# Patient Record
Sex: FEMALE | Race: WHITE | NOT HISPANIC OR LATINO | Employment: OTHER | URBAN - METROPOLITAN AREA
[De-identification: names, ages, dates, MRNs, and addresses within clinical notes are randomized per-mention and may not be internally consistent; named-entity substitution may affect disease eponyms.]

---

## 2023-09-20 PROBLEM — E55.9 VITAMIN D DEFICIENCY: Status: ACTIVE | Noted: 2023-09-20

## 2023-09-20 PROBLEM — J44.9 CHRONIC OBSTRUCTIVE PULMONARY DISEASE (MULTI): Status: ACTIVE | Noted: 2023-09-20

## 2023-09-20 PROBLEM — R53.82 CHRONIC FATIGUE: Status: ACTIVE | Noted: 2023-09-20

## 2023-09-20 PROBLEM — M81.0 POST-MENOPAUSAL OSTEOPOROSIS: Status: ACTIVE | Noted: 2023-09-20

## 2023-09-20 PROBLEM — E63.8 NUTRITIONAL INTAKE LESS THAN BODY REQUIREMENTS: Status: ACTIVE | Noted: 2023-09-20

## 2023-09-20 PROBLEM — F10.10 ETOH ABUSE: Status: ACTIVE | Noted: 2023-09-20

## 2023-09-20 PROBLEM — I10 ESSENTIAL HYPERTENSION: Status: ACTIVE | Noted: 2023-09-20

## 2023-09-20 PROBLEM — Z91.89 AT RISK FOR BLEEDING: Status: ACTIVE | Noted: 2023-09-20

## 2023-09-20 PROBLEM — I63.9 CVA (CEREBRAL VASCULAR ACCIDENT) (MULTI): Status: ACTIVE | Noted: 2023-09-20

## 2023-09-20 PROBLEM — G89.29 OTHER CHRONIC PAIN: Status: ACTIVE | Noted: 2023-09-20

## 2023-09-20 PROBLEM — F03.918 DEMENTIA WITH BEHAVIORAL DISTURBANCE (MULTI): Status: ACTIVE | Noted: 2023-09-20

## 2023-09-20 PROBLEM — I82.90 VENOUS THROMBOSIS: Status: ACTIVE | Noted: 2023-09-20

## 2023-09-20 PROBLEM — E78.2 MIXED HYPERLIPIDEMIA: Status: ACTIVE | Noted: 2023-09-20

## 2023-09-20 RX ORDER — CHOLECALCIFEROL (VITAMIN D3) 1250 MCG
50000 TABLET ORAL
COMMUNITY
Start: 2018-11-02 | End: 2023-10-26 | Stop reason: ALTCHOICE

## 2023-09-20 RX ORDER — ATORVASTATIN CALCIUM 20 MG/1
20 TABLET, FILM COATED ORAL DAILY
COMMUNITY
End: 2024-04-25 | Stop reason: SDUPTHER

## 2023-09-20 RX ORDER — ASPIRIN 81 MG/1
81 TABLET ORAL DAILY
COMMUNITY

## 2023-09-20 RX ORDER — LISINOPRIL 40 MG/1
40 TABLET ORAL DAILY
COMMUNITY
End: 2023-10-26 | Stop reason: SDUPTHER

## 2023-09-20 RX ORDER — AMLODIPINE BESYLATE 5 MG/1
5 TABLET ORAL NIGHTLY
COMMUNITY
End: 2023-10-26 | Stop reason: ALTCHOICE

## 2023-09-20 RX ORDER — HYDROCHLOROTHIAZIDE 25 MG/1
25 TABLET ORAL DAILY
COMMUNITY
End: 2023-10-26 | Stop reason: ALTCHOICE

## 2023-09-20 RX ORDER — DONEPEZIL HYDROCHLORIDE 10 MG/1
10 TABLET, FILM COATED ORAL NIGHTLY
COMMUNITY
End: 2023-10-26 | Stop reason: ALTCHOICE

## 2023-09-20 RX ORDER — POTASSIUM CHLORIDE 750 MG/1
10 CAPSULE, EXTENDED RELEASE ORAL DAILY
COMMUNITY
End: 2023-10-26 | Stop reason: ALTCHOICE

## 2023-10-26 ENCOUNTER — LAB (OUTPATIENT)
Dept: LAB | Facility: LAB | Age: 78
End: 2023-10-26
Payer: COMMERCIAL

## 2023-10-26 ENCOUNTER — OFFICE VISIT (OUTPATIENT)
Dept: PRIMARY CARE | Facility: CLINIC | Age: 78
End: 2023-10-26
Payer: COMMERCIAL

## 2023-10-26 VITALS
HEART RATE: 85 BPM | TEMPERATURE: 98.2 F | OXYGEN SATURATION: 97 % | SYSTOLIC BLOOD PRESSURE: 128 MMHG | WEIGHT: 140 LBS | BODY MASS INDEX: 26.45 KG/M2 | DIASTOLIC BLOOD PRESSURE: 84 MMHG

## 2023-10-26 DIAGNOSIS — E78.2 MIXED HYPERLIPIDEMIA: ICD-10-CM

## 2023-10-26 DIAGNOSIS — I10 ESSENTIAL HYPERTENSION: ICD-10-CM

## 2023-10-26 DIAGNOSIS — Z00.00 MEDICARE ANNUAL WELLNESS VISIT, SUBSEQUENT: Primary | ICD-10-CM

## 2023-10-26 DIAGNOSIS — E55.9 VITAMIN D DEFICIENCY: ICD-10-CM

## 2023-10-26 DIAGNOSIS — I63.9 CEREBROVASCULAR ACCIDENT (CVA), UNSPECIFIED MECHANISM (MULTI): ICD-10-CM

## 2023-10-26 DIAGNOSIS — M81.0 POST-MENOPAUSAL OSTEOPOROSIS: ICD-10-CM

## 2023-10-26 DIAGNOSIS — F03.90 DEMENTIA WITHOUT BEHAVIORAL DISTURBANCE (MULTI): ICD-10-CM

## 2023-10-26 LAB
25(OH)D3 SERPL-MCNC: 71 NG/ML (ref 31–100)
ALBUMIN SERPL-MCNC: 4.3 G/DL (ref 3.5–5)
ALP BLD-CCNC: 69 U/L (ref 35–125)
ALT SERPL-CCNC: 15 U/L (ref 5–40)
ANION GAP SERPL CALC-SCNC: 13 MMOL/L
AST SERPL-CCNC: 22 U/L (ref 5–40)
BASOPHILS # BLD AUTO: 0.05 X10*3/UL (ref 0–0.1)
BASOPHILS NFR BLD AUTO: 0.6 %
BILIRUB SERPL-MCNC: 0.7 MG/DL (ref 0.1–1.2)
BUN SERPL-MCNC: 8 MG/DL (ref 8–25)
CALCIUM SERPL-MCNC: 9.7 MG/DL (ref 8.5–10.4)
CHLORIDE SERPL-SCNC: 100 MMOL/L (ref 97–107)
CHOLEST SERPL-MCNC: 151 MG/DL (ref 133–200)
CHOLEST/HDLC SERPL: 2.1 {RATIO}
CO2 SERPL-SCNC: 21 MMOL/L (ref 24–31)
CREAT SERPL-MCNC: 0.9 MG/DL (ref 0.4–1.6)
EOSINOPHIL # BLD AUTO: 0.11 X10*3/UL (ref 0–0.4)
EOSINOPHIL NFR BLD AUTO: 1.3 %
ERYTHROCYTE [DISTWIDTH] IN BLOOD BY AUTOMATED COUNT: 13.7 % (ref 11.5–14.5)
GFR SERPL CREATININE-BSD FRML MDRD: 65 ML/MIN/1.73M*2
GLUCOSE SERPL-MCNC: 99 MG/DL (ref 65–99)
HCT VFR BLD AUTO: 44.8 % (ref 36–46)
HDLC SERPL-MCNC: 71 MG/DL
HGB BLD-MCNC: 15.1 G/DL (ref 12–16)
IMM GRANULOCYTES # BLD AUTO: 0.02 X10*3/UL (ref 0–0.5)
IMM GRANULOCYTES NFR BLD AUTO: 0.2 % (ref 0–0.9)
LDLC SERPL CALC-MCNC: 61 MG/DL (ref 65–130)
LYMPHOCYTES # BLD AUTO: 3.12 X10*3/UL (ref 0.8–3)
LYMPHOCYTES NFR BLD AUTO: 37 %
MCH RBC QN AUTO: 31.1 PG (ref 26–34)
MCHC RBC AUTO-ENTMCNC: 33.7 G/DL (ref 32–36)
MCV RBC AUTO: 92 FL (ref 80–100)
MONOCYTES # BLD AUTO: 0.68 X10*3/UL (ref 0.05–0.8)
MONOCYTES NFR BLD AUTO: 8.1 %
NEUTROPHILS # BLD AUTO: 4.45 X10*3/UL (ref 1.6–5.5)
NEUTROPHILS NFR BLD AUTO: 52.8 %
NRBC BLD-RTO: 0 /100 WBCS (ref 0–0)
PLATELET # BLD AUTO: 277 X10*3/UL (ref 150–450)
PMV BLD AUTO: 10.1 FL (ref 7.5–11.5)
POTASSIUM SERPL-SCNC: 4.9 MMOL/L (ref 3.4–5.1)
PROT SERPL-MCNC: 6.9 G/DL (ref 5.9–7.9)
RBC # BLD AUTO: 4.85 X10*6/UL (ref 4–5.2)
SODIUM SERPL-SCNC: 134 MMOL/L (ref 133–145)
TRIGL SERPL-MCNC: 97 MG/DL (ref 40–150)
WBC # BLD AUTO: 8.4 X10*3/UL (ref 4.4–11.3)

## 2023-10-26 PROCEDURE — 80053 COMPREHEN METABOLIC PANEL: CPT

## 2023-10-26 PROCEDURE — 85025 COMPLETE CBC W/AUTO DIFF WBC: CPT

## 2023-10-26 PROCEDURE — 36415 COLL VENOUS BLD VENIPUNCTURE: CPT

## 2023-10-26 PROCEDURE — 1159F MED LIST DOCD IN RCRD: CPT | Performed by: INTERNAL MEDICINE

## 2023-10-26 PROCEDURE — G0439 PPPS, SUBSEQ VISIT: HCPCS | Performed by: INTERNAL MEDICINE

## 2023-10-26 PROCEDURE — 1126F AMNT PAIN NOTED NONE PRSNT: CPT | Performed by: INTERNAL MEDICINE

## 2023-10-26 PROCEDURE — 3079F DIAST BP 80-89 MM HG: CPT | Performed by: INTERNAL MEDICINE

## 2023-10-26 PROCEDURE — 80061 LIPID PANEL: CPT

## 2023-10-26 PROCEDURE — 1036F TOBACCO NON-USER: CPT | Performed by: INTERNAL MEDICINE

## 2023-10-26 PROCEDURE — 3074F SYST BP LT 130 MM HG: CPT | Performed by: INTERNAL MEDICINE

## 2023-10-26 PROCEDURE — 82306 VITAMIN D 25 HYDROXY: CPT

## 2023-10-26 RX ORDER — ERGOCALCIFEROL (VITAMIN D2) 50 MCG
2000 CAPSULE ORAL DAILY
Qty: 90 CAPSULE | Refills: 2 | Status: SHIPPED | OUTPATIENT
Start: 2023-10-26 | End: 2024-07-22

## 2023-10-26 RX ORDER — LISINOPRIL 40 MG/1
40 TABLET ORAL DAILY
Qty: 90 TABLET | Refills: 1 | Status: SHIPPED | OUTPATIENT
Start: 2023-10-26 | End: 2024-04-25

## 2023-10-26 ASSESSMENT — ENCOUNTER SYMPTOMS
POLYPHAGIA: 0
COUGH: 0
FREQUENCY: 0
SEIZURES: 0
DEPRESSION: 0
TROUBLE SWALLOWING: 0
UNEXPECTED WEIGHT CHANGE: 0
POLYDIPSIA: 0
BLOOD IN STOOL: 0
CHILLS: 0
ABDOMINAL PAIN: 0
DYSURIA: 0
MYALGIAS: 0
SINUS PRESSURE: 0
NAUSEA: 0
SHORTNESS OF BREATH: 0
FATIGUE: 0
TREMORS: 0
NUMBNESS: 0
BACK PAIN: 0
PALPITATIONS: 0
VOMITING: 0
OCCASIONAL FEELINGS OF UNSTEADINESS: 0
WHEEZING: 0
LOSS OF SENSATION IN FEET: 0

## 2023-10-26 ASSESSMENT — PATIENT HEALTH QUESTIONNAIRE - PHQ9
2. FEELING DOWN, DEPRESSED OR HOPELESS: NOT AT ALL
SUM OF ALL RESPONSES TO PHQ9 QUESTIONS 1 AND 2: 0
1. LITTLE INTEREST OR PLEASURE IN DOING THINGS: NOT AT ALL

## 2023-10-26 ASSESSMENT — PAIN SCALES - GENERAL: PAINLEVEL: 0-NO PAIN

## 2023-10-26 NOTE — PROGRESS NOTES
Subjective   Patient ID: Allyssa Gregg is a 78 y.o. female who presents for No chief complaint on file..    HPI            She is accompanied by her sister.          She lives alone.         H/O dementia- stated has not started taking Aricept yet due to medication side effects. Advised patient to try for 1-2 weeks and give us a call back. She forgets day, week and month, she has a calendar, which helps her remember.         H/O HTN- compliant with medications.         H/O CVA- compliant with statin's. Not sure if she is taking Aspirin daily. Advised compliance with aspirin.          Denied any recent falls. Has food form meals on the wheels.         Drinks 2-3 beers a day - advised to reduce alcohol intake.    Review of Systems   Constitutional:  Negative for chills, fatigue and unexpected weight change.   HENT:  Negative for postnasal drip, sinus pressure and trouble swallowing.    Respiratory:  Negative for cough, shortness of breath and wheezing.    Cardiovascular:  Negative for chest pain, palpitations and leg swelling.   Gastrointestinal:  Negative for abdominal pain, blood in stool, nausea and vomiting.   Endocrine: Negative for polydipsia, polyphagia and polyuria.   Genitourinary:  Negative for dysuria and frequency.   Musculoskeletal:  Negative for back pain and myalgias.   Skin:  Negative for rash.   Neurological:  Negative for tremors, seizures and numbness.        Memory loss+   Psychiatric/Behavioral:  Negative for behavioral problems.        Objective   /84 (BP Location: Right arm, Patient Position: Sitting, BP Cuff Size: Adult)   Pulse 85   Temp 36.8 °C (98.2 °F) (Temporal)   Wt 63.5 kg (140 lb)   SpO2 97%   BMI 26.45 kg/m²     Physical Exam  Constitutional:       General: She is not in acute distress.     Appearance: Normal appearance.   HENT:      Head: Normocephalic and atraumatic.      Right Ear: Tympanic membrane normal.      Left Ear: Tympanic membrane normal.   Eyes:      Extraocular  Movements: Extraocular movements intact.      Pupils: Pupils are equal, round, and reactive to light.   Cardiovascular:      Rate and Rhythm: Normal rate and regular rhythm.      Heart sounds: Normal heart sounds. No murmur heard.     No friction rub.   Pulmonary:      Effort: Pulmonary effort is normal.      Breath sounds: Normal breath sounds. No wheezing or rales.   Abdominal:      General: Bowel sounds are normal.      Palpations: Abdomen is soft.      Tenderness: There is no abdominal tenderness. There is no guarding.   Musculoskeletal:         General: Normal range of motion.      Cervical back: Normal range of motion and neck supple.      Right lower leg: No edema.      Left lower leg: No edema.   Skin:     General: Skin is warm and dry.      Findings: No rash.   Neurological:      General: No focal deficit present.      Mental Status: She is alert.      Cranial Nerves: No cranial nerve deficit.      Sensory: No sensory deficit.      Comments: Oriented to herself   Psychiatric:         Mood and Affect: Mood normal.         Assessment/Plan       Diagnoses and all orders for this visit:  Medicare annual wellness visit, subsequent (Primary)  Essential hypertension  -     lisinopril 40 mg tablet; Take 1 tablet (40 mg) by mouth once daily.  -     CBC and Auto Differential; Future  -     Comprehensive Metabolic Panel; Future  Dementia without behavioral disturbance (CMS/HCC)  Vitamin D deficiency  -     Vitamin D 25-Hydroxy,Total (for eval of Vitamin D levels); Future  -     ergocalciferol (Vitamin D-2) 50 MCG (2000 UT) capsule capsule; Take 1 capsule (50 mcg) by mouth once daily.  Post-menopausal osteoporosis  Mixed hyperlipidemia  -     Lipid Panel; Future  Cerebrovascular accident (CVA), unspecified mechanism (CMS/HCC)     Patient and family reluctant to start any medications for dementia.  Offered Aricept before.  She is accompanied by her sister today, discussed to cpnsider healthcare power of  or will  due to her age and memory issues.       Current Outpatient Medications   Medication Instructions    aspirin 81 mg, oral, Daily    atorvastatin (LIPITOR) 20 mg, oral, Daily    ergocalciferol (VITAMIN D-2) 50 mcg, oral, Daily    lisinopril 40 mg, oral, Daily

## 2024-04-25 ENCOUNTER — OFFICE VISIT (OUTPATIENT)
Dept: PRIMARY CARE | Facility: CLINIC | Age: 79
End: 2024-04-25
Payer: COMMERCIAL

## 2024-04-25 ENCOUNTER — APPOINTMENT (OUTPATIENT)
Dept: PRIMARY CARE | Facility: CLINIC | Age: 79
End: 2024-04-25
Payer: COMMERCIAL

## 2024-04-25 VITALS
TEMPERATURE: 97.5 F | BODY MASS INDEX: 28.27 KG/M2 | SYSTOLIC BLOOD PRESSURE: 130 MMHG | OXYGEN SATURATION: 97 % | HEART RATE: 79 BPM | WEIGHT: 144 LBS | HEIGHT: 60 IN | DIASTOLIC BLOOD PRESSURE: 74 MMHG

## 2024-04-25 DIAGNOSIS — I10 ESSENTIAL HYPERTENSION: Primary | ICD-10-CM

## 2024-04-25 DIAGNOSIS — E78.2 MIXED HYPERLIPIDEMIA: ICD-10-CM

## 2024-04-25 DIAGNOSIS — F03.90 DEMENTIA WITHOUT BEHAVIORAL DISTURBANCE (MULTI): ICD-10-CM

## 2024-04-25 DIAGNOSIS — J44.9 CHRONIC OBSTRUCTIVE PULMONARY DISEASE, UNSPECIFIED COPD TYPE (MULTI): ICD-10-CM

## 2024-04-25 DIAGNOSIS — Z86.73 H/O: CVA (CEREBROVASCULAR ACCIDENT): ICD-10-CM

## 2024-04-25 PROCEDURE — 1124F ACP DISCUSS-NO DSCNMKR DOCD: CPT | Performed by: INTERNAL MEDICINE

## 2024-04-25 PROCEDURE — 99214 OFFICE O/P EST MOD 30 MIN: CPT | Performed by: INTERNAL MEDICINE

## 2024-04-25 PROCEDURE — 1036F TOBACCO NON-USER: CPT | Performed by: INTERNAL MEDICINE

## 2024-04-25 PROCEDURE — 3078F DIAST BP <80 MM HG: CPT | Performed by: INTERNAL MEDICINE

## 2024-04-25 PROCEDURE — 3075F SYST BP GE 130 - 139MM HG: CPT | Performed by: INTERNAL MEDICINE

## 2024-04-25 PROCEDURE — 1157F ADVNC CARE PLAN IN RCRD: CPT | Performed by: INTERNAL MEDICINE

## 2024-04-25 PROCEDURE — 1126F AMNT PAIN NOTED NONE PRSNT: CPT | Performed by: INTERNAL MEDICINE

## 2024-04-25 PROCEDURE — 1159F MED LIST DOCD IN RCRD: CPT | Performed by: INTERNAL MEDICINE

## 2024-04-25 RX ORDER — DONEPEZIL HYDROCHLORIDE 10 MG/1
10 TABLET, FILM COATED ORAL NIGHTLY
Qty: 90 TABLET | Refills: 1 | Status: SHIPPED | OUTPATIENT
Start: 2024-04-25 | End: 2024-10-22

## 2024-04-25 RX ORDER — ATORVASTATIN CALCIUM 20 MG/1
20 TABLET, FILM COATED ORAL DAILY
Qty: 90 TABLET | Refills: 3 | Status: SHIPPED | OUTPATIENT
Start: 2024-04-25

## 2024-04-25 ASSESSMENT — ENCOUNTER SYMPTOMS
PALPITATIONS: 0
BLOOD IN STOOL: 0
WHEEZING: 0
POLYDIPSIA: 0
FREQUENCY: 0
SHORTNESS OF BREATH: 0
DEPRESSION: 0
DYSURIA: 0
LOSS OF SENSATION IN FEET: 0
ABDOMINAL PAIN: 0
MYALGIAS: 0
CHILLS: 0
UNEXPECTED WEIGHT CHANGE: 0
TREMORS: 0
SEIZURES: 0
COUGH: 0
FATIGUE: 0
VOMITING: 0
NUMBNESS: 0
POLYPHAGIA: 0
TROUBLE SWALLOWING: 0
SINUS PRESSURE: 0
OCCASIONAL FEELINGS OF UNSTEADINESS: 0
BACK PAIN: 0
NAUSEA: 0

## 2024-04-25 ASSESSMENT — COLUMBIA-SUICIDE SEVERITY RATING SCALE - C-SSRS
2. HAVE YOU ACTUALLY HAD ANY THOUGHTS OF KILLING YOURSELF?: NO
6. HAVE YOU EVER DONE ANYTHING, STARTED TO DO ANYTHING, OR PREPARED TO DO ANYTHING TO END YOUR LIFE?: NO
1. IN THE PAST MONTH, HAVE YOU WISHED YOU WERE DEAD OR WISHED YOU COULD GO TO SLEEP AND NOT WAKE UP?: NO

## 2024-04-25 ASSESSMENT — PAIN SCALES - GENERAL: PAINLEVEL: 0-NO PAIN

## 2024-04-25 NOTE — PROGRESS NOTES
Subjective   Patient ID: Allyssa Gregg is a 78 y.o. female who presents for 6 month f/u.    HPI            She is accompanied by her sister.          She lives alone.         H/O dementia- stated has not started taking Aricept yet due to medication side effects. Advised patient to try for 1-2 weeks and give us a call back. She forgets day, week and month, she has a calendar, which helps her remember.  Discussed side effects with sister, she is going to discuss with Allyssa's kids         H/O HTN- compliant with medications.         H/O CVA- compliant with statin's. Not sure if she is taking Aspirin daily. Advised compliance with aspirin.          Denied any recent falls. Has food form meals on the wheels.         Drinks 2-3 beers a day - advised to reduce alcohol intake.    Review of Systems   Constitutional:  Negative for chills, fatigue and unexpected weight change.   HENT:  Negative for postnasal drip, sinus pressure and trouble swallowing.    Respiratory:  Negative for cough, shortness of breath and wheezing.    Cardiovascular:  Negative for chest pain, palpitations and leg swelling.   Gastrointestinal:  Negative for abdominal pain, blood in stool, nausea and vomiting.   Endocrine: Negative for polydipsia, polyphagia and polyuria.   Genitourinary:  Negative for dysuria and frequency.   Musculoskeletal:  Negative for back pain and myalgias.   Skin:  Negative for rash.   Neurological:  Negative for tremors, seizures and numbness.        Memory loss+   Psychiatric/Behavioral:  Negative for behavioral problems.        Objective   /74 (BP Location: Left arm, Patient Position: Sitting, BP Cuff Size: Large adult)   Pulse 79   Temp 36.4 °C (97.5 °F) (Temporal)   Ht 1.524 m (5')   Wt 65.3 kg (144 lb)   SpO2 97%   BMI 28.12 kg/m²     Physical Exam  Constitutional:       General: She is not in acute distress.     Appearance: Normal appearance.   HENT:      Head: Normocephalic and atraumatic.   Eyes:      Extraocular  Movements: Extraocular movements intact.   Cardiovascular:      Rate and Rhythm: Normal rate and regular rhythm.      Heart sounds: Normal heart sounds. No murmur heard.     No friction rub.   Pulmonary:      Effort: Pulmonary effort is normal.      Breath sounds: Normal breath sounds. No wheezing or rales.   Abdominal:      General: Bowel sounds are normal.      Palpations: Abdomen is soft.      Tenderness: There is no abdominal tenderness. There is no guarding.   Musculoskeletal:      Right lower leg: No edema.      Left lower leg: No edema.   Neurological:      General: No focal deficit present.      Mental Status: She is alert.      Cranial Nerves: No cranial nerve deficit.      Comments: Oriented to herself   Psychiatric:         Mood and Affect: Mood normal.         Assessment/Plan       Allyssa was seen today for 6 month f/u.  Diagnoses and all orders for this visit:  Essential hypertension (Primary)  Mixed hyperlipidemia  -     atorvastatin (Lipitor) 20 mg tablet; Take 1 tablet (20 mg) by mouth once daily.  Dementia without behavioral disturbance (Multi)  -     Referral to Podiatry; Future  -     donepezil (Aricept) 10 mg tablet; Take 1 tablet (10 mg) by mouth once daily at bedtime.  Chronic obstructive pulmonary disease, unspecified COPD type (Multi)  H/O: CVA (cerebrovascular accident)    Reviewed labs with patient  Started on Aricept, discussed medication side effects  Offered referral to case management as she may need some help with medications, as per sister she has a  to discuss Allyssa's needs  She is accompanied by her sister today, discussed to consider healthcare power of  or will due to her age and memory issues.       Current Outpatient Medications   Medication Instructions    aspirin 81 mg, oral, Daily    atorvastatin (LIPITOR) 20 mg, oral, Daily    donepezil (ARICEPT) 10 mg, oral, Nightly    ergocalciferol (VITAMIN D-2) 50 mcg, oral, Daily    lisinopril 40 mg, oral, Daily

## 2024-10-02 ENCOUNTER — APPOINTMENT (OUTPATIENT)
Dept: RADIOLOGY | Facility: HOSPITAL | Age: 79
End: 2024-10-02
Payer: COMMERCIAL

## 2024-10-02 ENCOUNTER — APPOINTMENT (OUTPATIENT)
Dept: CARDIOLOGY | Facility: HOSPITAL | Age: 79
End: 2024-10-02
Payer: COMMERCIAL

## 2024-10-02 ENCOUNTER — HOSPITAL ENCOUNTER (INPATIENT)
Facility: HOSPITAL | Age: 79
End: 2024-10-02
Attending: STUDENT IN AN ORGANIZED HEALTH CARE EDUCATION/TRAINING PROGRAM | Admitting: INTERNAL MEDICINE
Payer: COMMERCIAL

## 2024-10-02 DIAGNOSIS — R53.82 CHRONIC FATIGUE: ICD-10-CM

## 2024-10-02 DIAGNOSIS — I10 ESSENTIAL HYPERTENSION: ICD-10-CM

## 2024-10-02 DIAGNOSIS — R11.2 NAUSEA AND VOMITING, UNSPECIFIED VOMITING TYPE: ICD-10-CM
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
## 2024-10-02 DIAGNOSIS — R53.1 WEAKNESS: ICD-10-CM

## 2024-10-02 DIAGNOSIS — F10.10 ETOH ABUSE: ICD-10-CM

## 2024-10-02 LAB
ANION GAP SERPL CALCULATED.3IONS-SCNC: 16 MMOL/L (ref 10–20)
APPEARANCE UR: CLEAR
BASOPHILS # BLD AUTO: 0.03 X10*3/UL (ref 0–0.1)
BASOPHILS NFR BLD AUTO: 0.2 %
BILIRUB UR STRIP.AUTO-MCNC: NEGATIVE MG/DL
BUN SERPL-MCNC: 10 MG/DL (ref 6–23)
CALCIUM SERPL-MCNC: 9.1 MG/DL (ref 8.6–10.3)
CHLORIDE SERPL-SCNC: 90 MMOL/L (ref 98–107)
CK SERPL-CCNC: 240 U/L (ref 0–215)
CO2 SERPL-SCNC: 26 MMOL/L (ref 21–32)
COLOR UR: COLORLESS
CREAT SERPL-MCNC: 0.8 MG/DL (ref 0.5–1.05)
EGFRCR SERPLBLD CKD-EPI 2021: 75 ML/MIN/1.73M*2
EOSINOPHIL # BLD AUTO: 0.02 X10*3/UL (ref 0–0.4)
EOSINOPHIL NFR BLD AUTO: 0.1 %
ERYTHROCYTE [DISTWIDTH] IN BLOOD BY AUTOMATED COUNT: 13.3 % (ref 11.5–14.5)
GLUCOSE SERPL-MCNC: 143 MG/DL (ref 74–99)
GLUCOSE UR STRIP.AUTO-MCNC: ABNORMAL MG/DL
HCT VFR BLD AUTO: 52.6 % (ref 36–46)
HGB BLD-MCNC: 18.1 G/DL (ref 12–16)
IMM GRANULOCYTES # BLD AUTO: 0.05 X10*3/UL (ref 0–0.5)
IMM GRANULOCYTES NFR BLD AUTO: 0.4 % (ref 0–0.9)
KETONES UR STRIP.AUTO-MCNC: ABNORMAL MG/DL
LEUKOCYTE ESTERASE UR QL STRIP.AUTO: NEGATIVE
LIPASE SERPL-CCNC: 20 U/L (ref 9–82)
LYMPHOCYTES # BLD AUTO: 0.93 X10*3/UL (ref 0.8–3)
LYMPHOCYTES NFR BLD AUTO: 6.6 %
MCH RBC QN AUTO: 32.3 PG (ref 26–34)
MCHC RBC AUTO-ENTMCNC: 34.4 G/DL (ref 32–36)
MCV RBC AUTO: 94 FL (ref 80–100)
MONOCYTES # BLD AUTO: 0.44 X10*3/UL (ref 0.05–0.8)
MONOCYTES NFR BLD AUTO: 3.1 %
NEUTROPHILS # BLD AUTO: 12.53 X10*3/UL (ref 1.6–5.5)
NEUTROPHILS NFR BLD AUTO: 89.6 %
NITRITE UR QL STRIP.AUTO: NEGATIVE
NRBC BLD-RTO: 0 /100 WBCS (ref 0–0)
PH UR STRIP.AUTO: 6.5 [PH]
PLATELET # BLD AUTO: 292 X10*3/UL (ref 150–450)
POTASSIUM SERPL-SCNC: 3.1 MMOL/L (ref 3.5–5.3)
PROT UR STRIP.AUTO-MCNC: NEGATIVE MG/DL
RBC # BLD AUTO: 5.6 X10*6/UL (ref 4–5.2)
RBC # UR STRIP.AUTO: NEGATIVE /UL
SODIUM SERPL-SCNC: 129 MMOL/L (ref 136–145)
SP GR UR STRIP.AUTO: 1.01
UROBILINOGEN UR STRIP.AUTO-MCNC: NORMAL MG/DL
WBC # BLD AUTO: 14 X10*3/UL (ref 4.4–11.3)

## 2024-10-02 PROCEDURE — 36415 COLL VENOUS BLD VENIPUNCTURE: CPT

## 2024-10-02 PROCEDURE — 85025 COMPLETE CBC W/AUTO DIFF WBC: CPT

## 2024-10-02 PROCEDURE — 81003 URINALYSIS AUTO W/O SCOPE: CPT | Performed by: STUDENT IN AN ORGANIZED HEALTH CARE EDUCATION/TRAINING PROGRAM

## 2024-10-02 PROCEDURE — 96360 HYDRATION IV INFUSION INIT: CPT

## 2024-10-02 PROCEDURE — 99285 EMERGENCY DEPT VISIT HI MDM: CPT | Mod: 25

## 2024-10-02 PROCEDURE — 1100000001 HC PRIVATE ROOM DAILY

## 2024-10-02 PROCEDURE — 80048 BASIC METABOLIC PNL TOTAL CA: CPT

## 2024-10-02 PROCEDURE — 74177 CT ABD & PELVIS W/CONTRAST: CPT

## 2024-10-02 PROCEDURE — 70450 CT HEAD/BRAIN W/O DYE: CPT | Performed by: RADIOLOGY

## 2024-10-02 PROCEDURE — 2500000004 HC RX 250 GENERAL PHARMACY W/ HCPCS (ALT 636 FOR OP/ED): Performed by: INTERNAL MEDICINE

## 2024-10-02 PROCEDURE — 82550 ASSAY OF CK (CPK): CPT

## 2024-10-02 PROCEDURE — 2550000001 HC RX 255 CONTRASTS: Performed by: STUDENT IN AN ORGANIZED HEALTH CARE EDUCATION/TRAINING PROGRAM

## 2024-10-02 PROCEDURE — 70450 CT HEAD/BRAIN W/O DYE: CPT

## 2024-10-02 PROCEDURE — 93005 ELECTROCARDIOGRAM TRACING: CPT

## 2024-10-02 PROCEDURE — 2500000001 HC RX 250 WO HCPCS SELF ADMINISTERED DRUGS (ALT 637 FOR MEDICARE OP): Performed by: INTERNAL MEDICINE

## 2024-10-02 PROCEDURE — 2500000004 HC RX 250 GENERAL PHARMACY W/ HCPCS (ALT 636 FOR OP/ED): Performed by: STUDENT IN AN ORGANIZED HEALTH CARE EDUCATION/TRAINING PROGRAM

## 2024-10-02 PROCEDURE — 93010 ELECTROCARDIOGRAM REPORT: CPT | Performed by: INTERNAL MEDICINE

## 2024-10-02 PROCEDURE — 74177 CT ABD & PELVIS W/CONTRAST: CPT | Performed by: RADIOLOGY

## 2024-10-02 PROCEDURE — 99223 1ST HOSP IP/OBS HIGH 75: CPT | Performed by: INTERNAL MEDICINE

## 2024-10-02 PROCEDURE — 83690 ASSAY OF LIPASE: CPT | Performed by: STUDENT IN AN ORGANIZED HEALTH CARE EDUCATION/TRAINING PROGRAM

## 2024-10-02 PROCEDURE — 96361 HYDRATE IV INFUSION ADD-ON: CPT

## 2024-10-02 PROCEDURE — 2500000001 HC RX 250 WO HCPCS SELF ADMINISTERED DRUGS (ALT 637 FOR MEDICARE OP)

## 2024-10-02 RX ORDER — POTASSIUM CHLORIDE 750 MG/1
10 TABLET, FILM COATED, EXTENDED RELEASE ORAL DAILY
Status: DISCONTINUED | OUTPATIENT
Start: 2024-10-02 | End: 2024-10-02

## 2024-10-02 RX ORDER — ACETAMINOPHEN 325 MG/1
650 TABLET ORAL EVERY 6 HOURS PRN
Status: DISPENSED | OUTPATIENT
Start: 2024-10-02

## 2024-10-02 RX ORDER — ATORVASTATIN CALCIUM 20 MG/1
20 TABLET, FILM COATED ORAL NIGHTLY
Status: DISPENSED | OUTPATIENT
Start: 2024-10-03

## 2024-10-02 RX ORDER — LISINOPRIL 40 MG/1
40 TABLET ORAL DAILY
Status: DISPENSED | OUTPATIENT
Start: 2024-10-03

## 2024-10-02 RX ORDER — ASPIRIN 81 MG/1
81 TABLET ORAL DAILY
Status: DISPENSED | OUTPATIENT
Start: 2024-10-03

## 2024-10-02 RX ORDER — ACETAMINOPHEN 500 MG
5 TABLET ORAL NIGHTLY PRN
Status: DISPENSED | OUTPATIENT
Start: 2024-10-02

## 2024-10-02 RX ORDER — ALUMINUM HYDROXIDE, MAGNESIUM HYDROXIDE, AND SIMETHICONE 1200; 120; 1200 MG/30ML; MG/30ML; MG/30ML
30 SUSPENSION ORAL 4 TIMES DAILY PRN
Status: ACTIVE | OUTPATIENT
Start: 2024-10-02

## 2024-10-02 RX ORDER — POTASSIUM CHLORIDE 1.5 G/1.58G
20 POWDER, FOR SOLUTION ORAL ONCE
Status: COMPLETED | OUTPATIENT
Start: 2024-10-02 | End: 2024-10-02

## 2024-10-02 RX ORDER — ONDANSETRON HYDROCHLORIDE 2 MG/ML
4 INJECTION, SOLUTION INTRAVENOUS EVERY 6 HOURS PRN
Status: ACTIVE | OUTPATIENT
Start: 2024-10-02

## 2024-10-02 RX ORDER — SODIUM CHLORIDE AND POTASSIUM CHLORIDE 150; 900 MG/100ML; MG/100ML
100 INJECTION, SOLUTION INTRAVENOUS CONTINUOUS
Status: DISPENSED | OUTPATIENT
Start: 2024-10-02

## 2024-10-02 RX ORDER — DONEPEZIL HYDROCHLORIDE 10 MG/1
10 TABLET, FILM COATED ORAL NIGHTLY
Status: DISPENSED | OUTPATIENT
Start: 2024-10-02

## 2024-10-02 RX ORDER — ENOXAPARIN SODIUM 100 MG/ML
40 INJECTION SUBCUTANEOUS DAILY
Status: DISPENSED | OUTPATIENT
Start: 2024-10-03

## 2024-10-02 RX ORDER — ONDANSETRON 4 MG/1
4 TABLET, FILM COATED ORAL 4 TIMES DAILY PRN
Status: ACTIVE | OUTPATIENT
Start: 2024-10-02

## 2024-10-02 RX ADMIN — IOHEXOL 75 ML: 350 INJECTION, SOLUTION INTRAVENOUS at 20:57

## 2024-10-02 RX ADMIN — SODIUM CHLORIDE AND POTASSIUM CHLORIDE 100 ML/HR: 9; 1.49 INJECTION, SOLUTION INTRAVENOUS at 22:56

## 2024-10-02 RX ADMIN — Medication 5 MG: at 22:45

## 2024-10-02 RX ADMIN — SODIUM CHLORIDE 1000 ML: 9 INJECTION, SOLUTION INTRAVENOUS at 17:31

## 2024-10-02 RX ADMIN — POTASSIUM CHLORIDE 20 MEQ: 1.5 FOR SOLUTION ORAL at 21:26

## 2024-10-02 RX ADMIN — DONEPEZIL HYDROCHLORIDE 10 MG: 10 TABLET, FILM COATED ORAL at 22:45

## 2024-10-02 RX ADMIN — ACETAMINOPHEN 650 MG: 325 TABLET ORAL at 22:45

## 2024-10-02 ASSESSMENT — COLUMBIA-SUICIDE SEVERITY RATING SCALE - C-SSRS
1. IN THE PAST MONTH, HAVE YOU WISHED YOU WERE DEAD OR WISHED YOU COULD GO TO SLEEP AND NOT WAKE UP?: NO
2. HAVE YOU ACTUALLY HAD ANY THOUGHTS OF KILLING YOURSELF?: NO
6. HAVE YOU EVER DONE ANYTHING, STARTED TO DO ANYTHING, OR PREPARED TO DO ANYTHING TO END YOUR LIFE?: NO

## 2024-10-02 ASSESSMENT — COGNITIVE AND FUNCTIONAL STATUS - GENERAL
MOBILITY SCORE: 16
HELP NEEDED FOR BATHING: A LITTLE
EATING MEALS: A LITTLE
DAILY ACTIVITIY SCORE: 18
MOVING TO AND FROM BED TO CHAIR: A LITTLE
MOVING FROM LYING ON BACK TO SITTING ON SIDE OF FLAT BED WITH BEDRAILS: A LITTLE
TURNING FROM BACK TO SIDE WHILE IN FLAT BAD: A LITTLE
CLIMB 3 TO 5 STEPS WITH RAILING: A LOT
PERSONAL GROOMING: A LITTLE
DRESSING REGULAR UPPER BODY CLOTHING: A LITTLE
DRESSING REGULAR LOWER BODY CLOTHING: A LITTLE
STANDING UP FROM CHAIR USING ARMS: A LITTLE
TOILETING: A LITTLE
WALKING IN HOSPITAL ROOM: A LOT

## 2024-10-02 ASSESSMENT — PAIN SCALES - GENERAL
PAINLEVEL_OUTOF10: 0 - NO PAIN
PAINLEVEL_OUTOF10: 0 - NO PAIN

## 2024-10-02 ASSESSMENT — PAIN - FUNCTIONAL ASSESSMENT
PAIN_FUNCTIONAL_ASSESSMENT: 0-10
PAIN_FUNCTIONAL_ASSESSMENT: 0-10

## 2024-10-02 NOTE — ED PROVIDER NOTES
HPI   Chief Complaint   Patient presents with    Fall       Patient is an 79-year-old female presenting as a fall from.  Report is that patient was put to sleep in her bed and [employer] found her on the floor with multiple bouts of emesis on the ground.  Patient has history of dementia and is unable to recall the fall.  She states that she does not have any pain and denies having fallen or feelings of nausea.  Patient denies fevers, chills, cough, sore throat, runny nose, chest pain, shortness of breath, abdominal pain, nausea, vomiting, diarrhea or urinary complaints.  ROS is limited due to patient's history of dementia.              Patient History   No past medical history on file.  Past Surgical History:   Procedure Laterality Date    MR HEAD ANGIO WO IV CONTRAST  10/22/2016    MR HEAD ANGIO WO IV CONTRAST [facility]     No family history on file.  Social History     Tobacco Use    Smoking status: Former     Types: Cigarettes    Smokeless tobacco: Never   Vaping Use    Vaping status: Never Used   Substance Use Topics    Alcohol use: Yes     Alcohol/week: 4.0 standard drinks of alcohol     Types: 4 Cans of beer per week    Drug use: Never       Physical Exam   ED Triage Vitals [10/02/24 1617]   Temperature Heart Rate Respirations BP   36.4 °C (97.5 °F) 93 18 (!) 167/109      Pulse Ox Temp src Heart Rate Source Patient Position   95 % -- -- --      BP Location FiO2 (%)     -- --       Physical Exam  Vitals and nursing note reviewed.   Constitutional:       Appearance: She is well-developed.      Comments: Awake, pleasantly confused, laying in examination bed   HENT:      Head: Normocephalic and atraumatic.      Nose: Nose normal.      Mouth/Throat:      Mouth: Mucous membranes are moist.      Pharynx: Oropharynx is clear.   Eyes:      Extraocular Movements: Extraocular movements intact.      Conjunctiva/sclera: Conjunctivae normal.      Pupils: Pupils are equal, round, and reactive to light.    Cardiovascular:      Rate and Rhythm: Normal rate and regular rhythm.      Pulses: Normal pulses.      Heart sounds: Normal heart sounds. No murmur heard.  Pulmonary:      Effort: Pulmonary effort is normal. No respiratory distress.      Breath sounds: Normal breath sounds.   Abdominal:      Palpations: Abdomen is soft.      Tenderness: There is no abdominal tenderness.   Musculoskeletal:         General: No swelling. Normal range of motion.      Cervical back: Normal range of motion and neck supple.   Skin:     General: Skin is warm and dry.      Capillary Refill: Capillary refill takes less than 2 seconds.   Neurological:      General: No focal deficit present.      Mental Status: She is alert. Mental status is at baseline.   Psychiatric:         Mood and Affect: Mood normal.         Behavior: Behavior normal.           ED Course & MDM   ED Course as of 10/02/24 2321   Wed Oct 02, 2024   1645 EKG on my interpretation shows sinus rhythm with rate of 95 beats minute.  Normal axis.  QTc is 517 ms, AK interval 144.  There is patient movement artifact present in all leads.  No ST elevation or depression, no acute ischemic pattern.  No STEMI. [NT]   2134 I spoke with admitting provider Dr. Olivares.  After our discussion, patient will be admitted for placement. [AR]      ED Course User Index  [AR] Marlon Medeiros PA-C  [NT] Eleazar Mooney DO         Diagnoses as of 10/02/24 2321   Fall, initial encounter   Nausea and vomiting, unspecified vomiting type   Weakness                 No data recorded     Andi Coma Scale Score: 14 (10/02/24 1622 : Genesis Malik RN)       NIH Stroke Scale: 6 (10/02/24 1622 : Genesis Malik RN)                   Medical Decision Making  Patient is an 79-year-old female presenting after a fall from a nursing facility.  Lab work ordered.  Conditions considered include but not limited to: Mechanical fall, dementia, viral illness.    I saw this patient in conjunction with   Jesica.  Upon examination by attending physician, family is at bedside.  They report that [employer] did find this patient on the ground.  Report is that this patient also had a bout of emesis while in the daughter had arrived report is also that the patient was feeling significantly weak and was unable to get himself up off the floor and required assistance getting off the floor which is part of why she is presenting to the ER.  Additional lab work, urine, imaging ordered.    CBC does show leukocytosis with WC of 14.0 as well as hemoconcentration and hemoglobin of 18.1.  Concern for dehydration, IV fluids ordered.  BMP does show mild hyponatremia 129 and hypokalemia potassium 3.1.  Oral potassium ordered.  UA with micro is without infection.  Lipase within normal limit.  CK is elevated at 240.  Head CT without acute findings.  CT abdomen pelvis without acute findings.    I spoke with admitting provider, Dr. Olivares.  After our discussion, patient will be admitted for further management of weakness requiring long-term care placement.  As I deemed necessary from the patient's history, physical, laboratory and imaging findings as well as ED course, I considered the above listed diagnoses.    Portions of this note made with Dragon software, please be mindful of potential grammatical errors.        Medications   ondansetron (Zofran) tablet 4 mg (has no administration in time range)   ondansetron (Zofran) injection 4 mg (has no administration in time range)   acetaminophen (Tylenol) tablet 650 mg (650 mg oral Given 10/2/24 2245)   melatonin tablet 5 mg (5 mg oral Given 10/2/24 2245)   alum-mag hydroxide-simeth (Mylanta) 200-200-20 mg/5 mL oral suspension 30 mL (has no administration in time range)   enoxaparin (Lovenox) syringe 40 mg (has no administration in time range)   aspirin EC tablet 81 mg (has no administration in time range)   atorvastatin (Lipitor) tablet 20 mg (has no administration in time range)    donepezil (Aricept) tablet 10 mg (10 mg oral Given 10/2/24 2245)   lisinopril tablet 40 mg (has no administration in time range)   sodium chloride 0.9 % with KCl 20 mEq/L infusion (100 mL/hr intravenous New Bag 10/2/24 2256)   sodium chloride 0.9 % bolus 1,000 mL (0 mL intravenous Stopped 10/2/24 1907)   iohexol (OMNIPaque) 350 mg iodine/mL solution 75 mL (75 mL intravenous Given 10/2/24 2057)   potassium chloride (Klor-Con) packet 20 mEq (20 mEq oral Given 10/2/24 2126)         Labs Reviewed   CBC WITH AUTO DIFFERENTIAL - Abnormal       Result Value    WBC 14.0 (*)     nRBC 0.0      RBC 5.60 (*)     Hemoglobin 18.1 (*)     Hematocrit 52.6 (*)     MCV 94      MCH 32.3      MCHC 34.4      RDW 13.3      Platelets 292      Neutrophils % 89.6      Immature Granulocytes %, Automated 0.4      Lymphocytes % 6.6      Monocytes % 3.1      Eosinophils % 0.1      Basophils % 0.2      Neutrophils Absolute 12.53 (*)     Immature Granulocytes Absolute, Automated 0.05      Lymphocytes Absolute 0.93      Monocytes Absolute 0.44      Eosinophils Absolute 0.02      Basophils Absolute 0.03     BASIC METABOLIC PANEL - Abnormal    Glucose 143 (*)     Sodium 129 (*)     Potassium 3.1 (*)     Chloride 90 (*)     Bicarbonate 26      Anion Gap 16      Urea Nitrogen 10      Creatinine 0.80      eGFR 75      Calcium 9.1     CREATINE KINASE - Abnormal    Creatine Kinase 240 (*)    URINALYSIS WITH REFLEX CULTURE AND MICROSCOPIC - Abnormal    Color, Urine Colorless (*)     Appearance, Urine Clear      Specific Gravity, Urine 1.008      pH, Urine 6.5      Protein, Urine NEGATIVE      Glucose, Urine 70 (1+) (*)     Blood, Urine NEGATIVE      Ketones, Urine 10 (1+) (*)     Bilirubin, Urine NEGATIVE      Urobilinogen, Urine Normal      Nitrite, Urine NEGATIVE      Leukocyte Esterase, Urine NEGATIVE     LIPASE - Normal    Lipase 20      Narrative:     Venipuncture immediately after or during the administration of Metamizole may lead to falsely low  results. Testing should be performed immediately prior to Metamizole dosing.   URINALYSIS WITH REFLEX CULTURE AND MICROSCOPIC    Narrative:     The following orders were created for panel order Urinalysis with Reflex Culture and Microscopic.  Procedure                               Abnormality         Status                     ---------                               -----------         ------                     Urinalysis with Reflex C...[[id number]]  Abnormal            Final result               Extra Urine Gray Tube[[id number]]                            In process                   Please view results for these tests on the individual orders.   EXTRA URINE GRAY TUBE   BASIC METABOLIC PANEL   TSH         CT abdomen pelvis w IV contrast   Final Result   1.  No evidence of acute intra-abdominal pathology.             MACRO:   None        Signed by: Michael Hoover 10/2/2024 9:16 PM   Dictation workstation:   [id number]      CT head wo IV contrast   Final Result   No evidence of acute cortical infarct or intracranial hemorrhage.        MACRO:   None        Signed by: Michael Hoover 10/2/2024 9:05 PM   Dictation workstation:   [id number]            Procedure  Procedures     Marlon Medeiros PA-C  10/02/24 4503

## 2024-10-02 NOTE — ED PROVIDER NOTES
Given verbal order by  [facility] to order & give patient Influenza vaccine. This is an 79-year-old female with a past medical history of dementia, CVA, COPD, hypertension, hyperlipidemia who presents the emergency department for evaluation after being found on the ground unable to get up next to her bed.  [employer] called the family and said that she did not answer the door when they came to her house at 11 AM which is unusual for her.  They went to check on her and her daughter found her sitting next to the bed, with a couple of places where she vomited in the bedroom.  She was unable to get up due to general weakness.  EMS was called and she was brought to the hospital for evaluation.  It is unclear how long she has been on the ground, she was last seen well last night by her son before getting into bed.  The patient has dementia and is not a provide anything any full history.  She denies any pain, any complaints at this time, she feels otherwise well.  Daughter states that mentally she is at her baseline currently.    On exam she has no obvious signs of trauma.  No tenderness to palpation over the hips or pelvis, upper or lower extremities, the midline cervical thoracic or lumbar spine, there is no bruising or cephalhematoma to the head to suggest significant head trauma.  Given the unknown nature of her fall I did CT her head to evaluate for intracranial hemorrhage, labs were to assess for any cause for general weakness as well as CK to exclude rhabdomyolysis given unknown time on the ground.  Abdominal CT ordered given her vomiting and difficulty obtaining history due to her dementia to assess for any acute intra-abdominal pathology requiring treatment at the hospital such as infection or obstruction.    Her medical workup in the ED was unremarkable.  There is no evidence of UTI, she is dehydrated with low sodium and chloride and mildly hypokalemic likely due to her vomiting.  Potassium replacement was given in the ED.  CK was mildly elevated as well likely due to prolonged  downtime.  There is no evidence of renal insufficiency.  She does not have a UTI or other identifiable source for infection.    She has no focal neurologic deficits on exam concerning for stroke, however she is generally weak in all extremities.  She required 2 person assist to get up and ambulate to and from the bathroom in the emergency department, normally she is able to do this on her own and lives alone.  Family feels that she is not able to care for herself in this condition at home alone.  Because of this she will be admitted to the hospital for further medical management of her dehydration, potential placement for rehab or skilled nursing if she remains unable to care for herself.    I did review the chest x-ray image and do not see any infiltrates concerning for pneumonia, pneumothorax or other acute process in the chest.  Radiology confirms this.    ED Course as of 10/03/24 1338   Wed Oct 02, 2024   1645 EKG on my interpretation shows sinus rhythm with rate of 95 beats minute.  Normal axis.  QTc is 517 ms, MI interval 144.  There is patient movement artifact present in all leads.  No ST elevation or depression, no acute ischemic pattern.  No STEMI. [NT]   2134 I spoke with admitting provider Dr. Olivares.  After our discussion, patient will be admitted for placement. [AR]      ED Course User Index  [AR] Marlon Medeiros PA-C  [NT] Eleazar Mooney DO         Diagnoses as of 10/03/24 1338   Fall, initial encounter   Nausea and vomiting, unspecified vomiting type   Weakness         Physical Exam  General: well developed, well nourished elderly female who is awake and alert, oriented to self, in no apparent distress.  Dementia is evident, daughter at bedside.  Eyes: sclera clear bilaterally, PERRL, EOMI  HENT: normocephalic, atraumatic. Pharynx without erythema or exudates, uvula midline.  No apparent oral or dental injury.  CV: regular rate and rhythm, no murmur, no gallops, or rubs.   Resp: clear  to ascultation bilaterally, no wheezes, rales, or rhonchi  GI: abdomen soft, nontender without rigidity or guarding, no peritoneal signs, abdomen is nondistended, no masses palpated  MSK: No midline spinal tenderness, strength +5/5 to upper and lower extremities bilaterally, no swelling of the extremities.  Neuro: no focal deficits, CN2-12 intact. Sensation fully intact.   Psych: Pleasantly demented, calm and cooperative with exam  Skin: warm, dry, without evidence of rash or abrasions       Eleazar Mooney,   10/03/24 7606

## 2024-10-02 NOTE — ED TRIAGE NOTES
Pt brought via EMS from home for unwitnessed presumed fall, sister found her down on the ground, unknown downtime. Pt confused in triage, hx dementia but more confused compared to baseline

## 2024-10-03 PROBLEM — F03.90 DEMENTIA: Status: ACTIVE | Noted: 2024-10-03

## 2024-10-03 PROBLEM — E87.1 HYPONATREMIA: Status: ACTIVE | Noted: 2024-10-03

## 2024-10-03 PROBLEM — D72.829 LEUKOCYTOSIS: Status: ACTIVE | Noted: 2024-10-03

## 2024-10-03 LAB
ANION GAP SERPL CALCULATED.3IONS-SCNC: 15 MMOL/L (ref 10–20)
ATRIAL RATE: 95 BPM
BUN SERPL-MCNC: 10 MG/DL (ref 6–23)
CALCIUM SERPL-MCNC: 8.7 MG/DL (ref 8.6–10.3)
CHLORIDE SERPL-SCNC: 96 MMOL/L (ref 98–107)
CO2 SERPL-SCNC: 23 MMOL/L (ref 21–32)
CREAT SERPL-MCNC: 0.77 MG/DL (ref 0.5–1.05)
EGFRCR SERPLBLD CKD-EPI 2021: 78 ML/MIN/1.73M*2
ERYTHROCYTE [DISTWIDTH] IN BLOOD BY AUTOMATED COUNT: 13.2 % (ref 11.5–14.5)
GLUCOSE SERPL-MCNC: 127 MG/DL (ref 74–99)
HCT VFR BLD AUTO: 43.6 % (ref 36–46)
HGB BLD-MCNC: 15.3 G/DL (ref 12–16)
HOLD SPECIMEN: NORMAL
MAGNESIUM SERPL-MCNC: 1.83 MG/DL (ref 1.6–2.4)
MCH RBC QN AUTO: 32.1 PG (ref 26–34)
MCHC RBC AUTO-ENTMCNC: 35.1 G/DL (ref 32–36)
MCV RBC AUTO: 91 FL (ref 80–100)
NRBC BLD-RTO: 0 /100 WBCS (ref 0–0)
P AXIS: 41 DEGREES
P OFFSET: 193 MS
P ONSET: 140 MS
PLATELET # BLD AUTO: 303 X10*3/UL (ref 150–450)
POTASSIUM SERPL-SCNC: 3.5 MMOL/L (ref 3.5–5.3)
PR INTERVAL: 144 MS
Q ONSET: 212 MS
QRS COUNT: 16 BEATS
QRS DURATION: 112 MS
QT INTERVAL: 412 MS
QTC CALCULATION(BAZETT): 517 MS
QTC FREDERICIA: 479 MS
R AXIS: -29 DEGREES
RBC # BLD AUTO: 4.77 X10*6/UL (ref 4–5.2)
SODIUM SERPL-SCNC: 130 MMOL/L (ref 136–145)
T AXIS: 95 DEGREES
T OFFSET: 418 MS
TSH SERPL-ACNC: 1.41 MIU/L (ref 0.44–3.98)
VENTRICULAR RATE: 95 BPM
WBC # BLD AUTO: 13.7 X10*3/UL (ref 4.4–11.3)

## 2024-10-03 PROCEDURE — 82374 ASSAY BLOOD CARBON DIOXIDE: CPT | Performed by: INTERNAL MEDICINE

## 2024-10-03 PROCEDURE — 97166 OT EVAL MOD COMPLEX 45 MIN: CPT | Mod: GO

## 2024-10-03 PROCEDURE — 36415 COLL VENOUS BLD VENIPUNCTURE: CPT | Performed by: INTERNAL MEDICINE

## 2024-10-03 PROCEDURE — 2500000001 HC RX 250 WO HCPCS SELF ADMINISTERED DRUGS (ALT 637 FOR MEDICARE OP): Performed by: INTERNAL MEDICINE

## 2024-10-03 PROCEDURE — 83735 ASSAY OF MAGNESIUM: CPT | Performed by: INTERNAL MEDICINE

## 2024-10-03 PROCEDURE — 80048 BASIC METABOLIC PNL TOTAL CA: CPT | Performed by: INTERNAL MEDICINE

## 2024-10-03 PROCEDURE — 97535 SELF CARE MNGMENT TRAINING: CPT | Mod: GO

## 2024-10-03 PROCEDURE — 1100000001 HC PRIVATE ROOM DAILY

## 2024-10-03 PROCEDURE — 85027 COMPLETE CBC AUTOMATED: CPT | Performed by: INTERNAL MEDICINE

## 2024-10-03 PROCEDURE — 84443 ASSAY THYROID STIM HORMONE: CPT | Performed by: INTERNAL MEDICINE

## 2024-10-03 PROCEDURE — 97162 PT EVAL MOD COMPLEX 30 MIN: CPT | Mod: GP

## 2024-10-03 PROCEDURE — 2500000004 HC RX 250 GENERAL PHARMACY W/ HCPCS (ALT 636 FOR OP/ED): Performed by: INTERNAL MEDICINE

## 2024-10-03 RX ORDER — POTASSIUM CHLORIDE 1.5 G/1.58G
20 POWDER, FOR SOLUTION ORAL DAILY
Status: DISPENSED | OUTPATIENT
Start: 2024-10-03

## 2024-10-03 RX ADMIN — POTASSIUM CHLORIDE 20 MEQ: 1.5 FOR SOLUTION ORAL at 09:30

## 2024-10-03 RX ADMIN — ATORVASTATIN CALCIUM 20 MG: 20 TABLET, FILM COATED ORAL at 21:33

## 2024-10-03 RX ADMIN — DONEPEZIL HYDROCHLORIDE 10 MG: 10 TABLET, FILM COATED ORAL at 21:30

## 2024-10-03 RX ADMIN — Medication 5 MG: at 21:33

## 2024-10-03 RX ADMIN — SODIUM CHLORIDE AND POTASSIUM CHLORIDE 100 ML/HR: 9; 1.49 INJECTION, SOLUTION INTRAVENOUS at 17:37

## 2024-10-03 RX ADMIN — LISINOPRIL 40 MG: 40 TABLET ORAL at 09:30

## 2024-10-03 RX ADMIN — ASPIRIN 81 MG: 81 TABLET, COATED ORAL at 09:30

## 2024-10-03 RX ADMIN — ENOXAPARIN SODIUM 40 MG: 40 INJECTION SUBCUTANEOUS at 09:30

## 2024-10-03 RX ADMIN — ACETAMINOPHEN 650 MG: 325 TABLET ORAL at 21:33

## 2024-10-03 SDOH — HEALTH STABILITY: MENTAL HEALTH: HOW MANY DRINKS CONTAINING ALCOHOL DO YOU HAVE ON A TYPICAL DAY WHEN YOU ARE DRINKING?: 1 OR 2

## 2024-10-03 SDOH — ECONOMIC STABILITY: HOUSING INSECURITY: AT ANY TIME IN THE PAST 12 MONTHS, WERE YOU HOMELESS OR LIVING IN A SHELTER (INCLUDING NOW)?: NO

## 2024-10-03 SDOH — HEALTH STABILITY: PHYSICAL HEALTH
HOW OFTEN DO YOU NEED TO HAVE SOMEONE HELP YOU WHEN YOU READ INSTRUCTIONS, PAMPHLETS, OR OTHER WRITTEN MATERIAL FROM YOUR DOCTOR OR PHARMACY?: NEVER

## 2024-10-03 SDOH — SOCIAL STABILITY: SOCIAL INSECURITY
WITHIN THE LAST YEAR, HAVE TO BEEN RAPED OR FORCED TO HAVE ANY KIND OF SEXUAL ACTIVITY BY YOUR PARTNER OR EX-PARTNER?: NO

## 2024-10-03 SDOH — SOCIAL STABILITY: SOCIAL NETWORK
DO YOU BELONG TO ANY CLUBS OR ORGANIZATIONS SUCH AS CHURCH GROUPS, UNIONS, FRATERNAL OR ATHLETIC GROUPS, OR SCHOOL GROUPS?: NO

## 2024-10-03 SDOH — HEALTH STABILITY: MENTAL HEALTH: HOW OFTEN DO YOU HAVE A DRINK CONTAINING ALCOHOL?: 2-4 TIMES A MONTH

## 2024-10-03 SDOH — HEALTH STABILITY: MENTAL HEALTH: HOW OFTEN DO YOU HAVE 6 OR MORE DRINKS ON ONE OCCASION?: NEVER

## 2024-10-03 SDOH — SOCIAL STABILITY: SOCIAL INSECURITY
WITHIN THE LAST YEAR, HAVE YOU BEEN KICKED, HIT, SLAPPED, OR OTHERWISE PHYSICALLY HURT BY YOUR PARTNER OR EX-PARTNER?: NO

## 2024-10-03 SDOH — SOCIAL STABILITY: SOCIAL INSECURITY: WITHIN THE LAST YEAR, HAVE YOU BEEN AFRAID OF YOUR PARTNER OR EX-PARTNER?: NO

## 2024-10-03 SDOH — SOCIAL STABILITY: SOCIAL INSECURITY: WITHIN THE LAST YEAR, HAVE YOU BEEN HUMILIATED OR EMOTIONALLY ABUSED IN OTHER WAYS BY YOUR PARTNER OR EX-PARTNER?: NO

## 2024-10-03 SDOH — ECONOMIC STABILITY: FOOD INSECURITY: WITHIN THE PAST 12 MONTHS, YOU WORRIED THAT YOUR FOOD WOULD RUN OUT BEFORE YOU GOT THE MONEY TO BUY MORE.: NEVER TRUE

## 2024-10-03 SDOH — ECONOMIC STABILITY: FOOD INSECURITY: WITHIN THE PAST 12 MONTHS, THE FOOD YOU BOUGHT JUST DIDN'T LAST AND YOU DIDN'T HAVE MONEY TO GET MORE.: NEVER TRUE

## 2024-10-03 SDOH — SOCIAL STABILITY: SOCIAL NETWORK: HOW OFTEN DO YOU ATTENT MEETINGS OF THE CLUB OR ORGANIZATION YOU BELONG TO?: NEVER

## 2024-10-03 SDOH — SOCIAL STABILITY: SOCIAL NETWORK
IN A TYPICAL WEEK, HOW MANY TIMES DO YOU TALK ON THE PHONE WITH FAMILY, FRIENDS, OR NEIGHBORS?: MORE THAN THREE TIMES A WEEK

## 2024-10-03 SDOH — SOCIAL STABILITY: SOCIAL NETWORK
DO YOU BELONG TO ANY CLUBS OR ORGANIZATIONS SUCH AS CHURCH GROUPS UNIONS, FRATERNAL OR ATHLETIC GROUPS, OR SCHOOL GROUPS?: NO

## 2024-10-03 SDOH — ECONOMIC STABILITY: TRANSPORTATION INSECURITY
IN THE PAST 12 MONTHS, HAS THE LACK OF TRANSPORTATION KEPT YOU FROM MEDICAL APPOINTMENTS OR FROM GETTING MEDICATIONS?: NO

## 2024-10-03 SDOH — SOCIAL STABILITY: SOCIAL NETWORK: HOW OFTEN DO YOU ATTEND MEETINGS OF THE CLUBS OR ORGANIZATIONS YOU BELONG TO?: NEVER

## 2024-10-03 SDOH — SOCIAL STABILITY: SOCIAL NETWORK: HOW OFTEN DO YOU GET TOGETHER WITH FRIENDS OR RELATIVES?: MORE THAN THREE TIMES A WEEK

## 2024-10-03 SDOH — ECONOMIC STABILITY: INCOME INSECURITY: IN THE PAST 12 MONTHS HAS THE ELECTRIC, GAS, OIL, OR WATER COMPANY THREATENED TO SHUT OFF SERVICES IN YOUR HOME?: NO

## 2024-10-03 SDOH — ECONOMIC STABILITY: HOUSING INSECURITY: IN THE LAST 12 MONTHS, WAS THERE A TIME WHEN YOU WERE NOT ABLE TO PAY THE MORTGAGE OR RENT ON TIME?: NO

## 2024-10-03 SDOH — SOCIAL STABILITY: SOCIAL INSECURITY: ARE YOU MARRIED, WIDOWED, DIVORCED, SEPARATED, NEVER MARRIED, OR LIVING WITH A PARTNER?: WIDOWED

## 2024-10-03 SDOH — ECONOMIC STABILITY: TRANSPORTATION INSECURITY
IN THE PAST 12 MONTHS, HAS LACK OF TRANSPORTATION KEPT YOU FROM MEETINGS, WORK, OR FROM GETTING THINGS NEEDED FOR DAILY LIVING?: NO

## 2024-10-03 SDOH — HEALTH STABILITY: MENTAL HEALTH: HOW OFTEN DO YOU HAVE SIX OR MORE DRINKS ON ONE OCCASION?: NEVER

## 2024-10-03 SDOH — SOCIAL STABILITY: SOCIAL NETWORK: HOW OFTEN DO YOU ATTEND CHURCH OR RELIGIOUS SERVICES?: NEVER

## 2024-10-03 SDOH — HEALTH STABILITY: MENTAL HEALTH
DO YOU FEEL STRESS - TENSE, RESTLESS, NERVOUS, OR ANXIOUS, OR UNABLE TO SLEEP AT NIGHT BECAUSE YOUR MIND IS TROUBLED ALL THE TIME - THESE DAYS?: PATIENT UNABLE TO ANSWER

## 2024-10-03 SDOH — ECONOMIC STABILITY: FOOD INSECURITY: WITHIN THE PAST 12 MONTHS, YOU WORRIED THAT YOUR FOOD WOULD RUN OUT BEFORE YOU GOT MONEY TO BUY MORE.: NEVER TRUE

## 2024-10-03 SDOH — ECONOMIC STABILITY: FOOD INSECURITY: HOW HARD IS IT FOR YOU TO PAY FOR THE VERY BASICS LIKE FOOD, HOUSING, MEDICAL CARE, AND HEATING?: NOT HARD AT ALL

## 2024-10-03 SDOH — HEALTH STABILITY: PHYSICAL HEALTH: ON AVERAGE, HOW MANY DAYS PER WEEK DO YOU ENGAGE IN MODERATE TO STRENUOUS EXERCISE (LIKE A BRISK WALK)?: 0 DAYS

## 2024-10-03 SDOH — SOCIAL STABILITY: SOCIAL INSECURITY: ARE YOU OR HAVE YOU BEEN THREATENED OR ABUSED PHYSICALLY, EMOTIONALLY, OR SEXUALLY BY ANYONE?: UNABLE TO ASSESS

## 2024-10-03 SDOH — ECONOMIC STABILITY: TRANSPORTATION INSECURITY: IN THE PAST 12 MONTHS, HAS LACK OF TRANSPORTATION KEPT YOU FROM MEDICAL APPOINTMENTS OR FROM GETTING MEDICATIONS?: NO

## 2024-10-03 SDOH — ECONOMIC STABILITY: INCOME INSECURITY: IN THE LAST 12 MONTHS, WAS THERE A TIME WHEN YOU WERE NOT ABLE TO PAY THE MORTGAGE OR RENT ON TIME?: NO

## 2024-10-03 SDOH — SOCIAL STABILITY: SOCIAL INSECURITY
WITHIN THE LAST YEAR, HAVE YOU BEEN RAPED OR FORCED TO HAVE ANY KIND OF SEXUAL ACTIVITY BY YOUR PARTNER OR EX-PARTNER?: NO

## 2024-10-03 SDOH — SOCIAL STABILITY: SOCIAL INSECURITY: HAVE YOU HAD THOUGHTS OF HARMING ANYONE ELSE?: UNABLE TO ASSESS

## 2024-10-03 SDOH — SOCIAL STABILITY: SOCIAL INSECURITY: HAS ANYONE EVER THREATENED TO HURT YOUR FAMILY OR YOUR PETS?: UNABLE TO ASSESS

## 2024-10-03 SDOH — SOCIAL STABILITY: SOCIAL INSECURITY: ARE THERE ANY APPARENT SIGNS OF INJURIES/BEHAVIORS THAT COULD BE RELATED TO ABUSE/NEGLECT?: UNABLE TO ASSESS

## 2024-10-03 SDOH — SOCIAL STABILITY: SOCIAL INSECURITY: DO YOU FEEL UNSAFE GOING BACK TO THE PLACE WHERE YOU ARE LIVING?: UNABLE TO ASSESS

## 2024-10-03 SDOH — SOCIAL STABILITY: SOCIAL INSECURITY: WERE YOU ABLE TO COMPLETE ALL THE BEHAVIORAL HEALTH SCREENINGS?: YES

## 2024-10-03 SDOH — SOCIAL STABILITY: SOCIAL NETWORK: ARE YOU MARRIED, WIDOWED, DIVORCED, SEPARATED, NEVER MARRIED, OR LIVING WITH A PARTNER?: WIDOWED

## 2024-10-03 SDOH — ECONOMIC STABILITY: INCOME INSECURITY: IN THE PAST 12 MONTHS, HAS THE ELECTRIC, GAS, OIL, OR WATER COMPANY THREATENED TO SHUT OFF SERVICE IN YOUR HOME?: NO

## 2024-10-03 SDOH — HEALTH STABILITY: PHYSICAL HEALTH: ON AVERAGE, HOW MANY MINUTES DO YOU ENGAGE IN EXERCISE AT THIS LEVEL?: 0 MIN

## 2024-10-03 SDOH — HEALTH STABILITY: MENTAL HEALTH: HOW MANY STANDARD DRINKS CONTAINING ALCOHOL DO YOU HAVE ON A TYPICAL DAY?: 1 OR 2

## 2024-10-03 SDOH — SOCIAL STABILITY: SOCIAL INSECURITY: DOES ANYONE TRY TO KEEP YOU FROM HAVING/CONTACTING OTHER FRIENDS OR DOING THINGS OUTSIDE YOUR HOME?: UNABLE TO ASSESS

## 2024-10-03 SDOH — SOCIAL STABILITY: SOCIAL INSECURITY: DO YOU FEEL ANYONE HAS EXPLOITED OR TAKEN ADVANTAGE OF YOU FINANCIALLY OR OF YOUR PERSONAL PROPERTY?: UNABLE TO ASSESS

## 2024-10-03 SDOH — SOCIAL STABILITY: SOCIAL INSECURITY: ABUSE: ADULT

## 2024-10-03 SDOH — HEALTH STABILITY: MENTAL HEALTH
STRESS IS WHEN SOMEONE FEELS TENSE, NERVOUS, ANXIOUS, OR CAN'T SLEEP AT NIGHT BECAUSE THEIR MIND IS TROUBLED. HOW STRESSED ARE YOU?: PATIENT UNABLE TO ANSWER

## 2024-10-03 SDOH — ECONOMIC STABILITY: INCOME INSECURITY: HOW HARD IS IT FOR YOU TO PAY FOR THE VERY BASICS LIKE FOOD, HOUSING, MEDICAL CARE, AND HEATING?: NOT HARD AT ALL

## 2024-10-03 SDOH — SOCIAL STABILITY: SOCIAL INSECURITY: HAVE YOU HAD ANY THOUGHTS OF HARMING ANYONE ELSE?: UNABLE TO ASSESS

## 2024-10-03 ASSESSMENT — PAIN - FUNCTIONAL ASSESSMENT
PAIN_FUNCTIONAL_ASSESSMENT: 0-10
PAIN_FUNCTIONAL_ASSESSMENT: 0-10
PAIN_FUNCTIONAL_ASSESSMENT: WONG-BAKER FACES
PAIN_FUNCTIONAL_ASSESSMENT: WONG-BAKER FACES

## 2024-10-03 ASSESSMENT — COGNITIVE AND FUNCTIONAL STATUS - GENERAL
TOILETING: A LOT
DAILY ACTIVITIY SCORE: 15
PERSONAL GROOMING: A LOT
MOVING FROM LYING ON BACK TO SITTING ON SIDE OF FLAT BED WITH BEDRAILS: A LITTLE
WALKING IN HOSPITAL ROOM: A LOT
CLIMB 3 TO 5 STEPS WITH RAILING: A LOT
STANDING UP FROM CHAIR USING ARMS: A LITTLE
TOILETING: A LOT
MOVING FROM LYING ON BACK TO SITTING ON SIDE OF FLAT BED WITH BEDRAILS: A LITTLE
HELP NEEDED FOR BATHING: A LOT
MOVING TO AND FROM BED TO CHAIR: A LITTLE
DRESSING REGULAR LOWER BODY CLOTHING: A LITTLE
DAILY ACTIVITIY SCORE: 16
WALKING IN HOSPITAL ROOM: A LOT
EATING MEALS: A LITTLE
PATIENT BASELINE BEDBOUND: NO
DRESSING REGULAR LOWER BODY CLOTHING: A LOT
DRESSING REGULAR LOWER BODY CLOTHING: A LITTLE
STANDING UP FROM CHAIR USING ARMS: A LITTLE
STANDING UP FROM CHAIR USING ARMS: A LITTLE
EATING MEALS: A LITTLE
MOBILITY SCORE: 16
WALKING IN HOSPITAL ROOM: A LOT
TURNING FROM BACK TO SIDE WHILE IN FLAT BAD: A LITTLE
TOILETING: A LOT
MOBILITY SCORE: 16
PERSONAL GROOMING: A LOT
MOVING TO AND FROM BED TO CHAIR: A LITTLE
DRESSING REGULAR UPPER BODY CLOTHING: A LITTLE
HELP NEEDED FOR BATHING: A LITTLE
MOBILITY SCORE: 16
MOVING FROM LYING ON BACK TO SITTING ON SIDE OF FLAT BED WITH BEDRAILS: A LITTLE
DRESSING REGULAR UPPER BODY CLOTHING: A LITTLE
DAILY ACTIVITIY SCORE: 16
EATING MEALS: A LITTLE
HELP NEEDED FOR BATHING: A LITTLE
CLIMB 3 TO 5 STEPS WITH RAILING: A LOT
MOVING TO AND FROM BED TO CHAIR: A LITTLE
CLIMB 3 TO 5 STEPS WITH RAILING: A LOT
PERSONAL GROOMING: A LITTLE
DRESSING REGULAR UPPER BODY CLOTHING: A LITTLE

## 2024-10-03 ASSESSMENT — LIFESTYLE VARIABLES
SUBSTANCE_ABUSE_PAST_12_MONTHS: NO
HOW OFTEN DO YOU HAVE A DRINK CONTAINING ALCOHOL: 4 OR MORE TIMES A WEEK
HOW OFTEN DO YOU HAVE 6 OR MORE DRINKS ON ONE OCCASION: DAILY OR ALMOST DAILY
SKIP TO QUESTIONS 9-10: 0
HOW MANY STANDARD DRINKS CONTAINING ALCOHOL DO YOU HAVE ON A TYPICAL DAY: PATIENT UNABLE TO ANSWER
SKIP TO QUESTIONS 9-10: 1
AUDIT-C TOTAL SCORE: 2
AUDIT-C TOTAL SCORE: -1
PRESCIPTION_ABUSE_PAST_12_MONTHS: NO
AUDIT-C TOTAL SCORE: -1

## 2024-10-03 ASSESSMENT — ACTIVITIES OF DAILY LIVING (ADL)
TOILETING: NEEDS ASSISTANCE
BATHING: NEEDS ASSISTANCE
GROOMING: NEEDS ASSISTANCE
ADEQUATE_TO_COMPLETE_ADL: YES
HEARING - RIGHT EAR: DIFFICULTY WITH NOISE
ADLS_ADDRESSED: NO
FEEDING YOURSELF: NEEDS ASSISTANCE
LACK_OF_TRANSPORTATION: NO
DRESSING YOURSELF: NEEDS ASSISTANCE
HOME_MANAGEMENT_TIME_ENTRY: 8
HEARING - LEFT EAR: DIFFICULTY WITH NOISE
WALKS IN HOME: NEEDS ASSISTANCE
BATHING_ASSISTANCE: MODERATE
LACK_OF_TRANSPORTATION: NO
JUDGMENT_ADEQUATE_SAFELY_COMPLETE_DAILY_ACTIVITIES: NO
PATIENT'S MEMORY ADEQUATE TO SAFELY COMPLETE DAILY ACTIVITIES?: NO

## 2024-10-03 ASSESSMENT — PAIN SCALES - PAIN ASSESSMENT IN ADVANCED DEMENTIA (PAINAD)
BODYLANGUAGE: RELAXED
FACIALEXPRESSION: SMILING OR INEXPRESSIVE
BREATHING: NORMAL
TOTALSCORE: 0
CONSOLABILITY: NO NEED TO CONSOLE

## 2024-10-03 ASSESSMENT — PATIENT HEALTH QUESTIONNAIRE - PHQ9
2. FEELING DOWN, DEPRESSED OR HOPELESS: NOT AT ALL
SUM OF ALL RESPONSES TO PHQ9 QUESTIONS 1 & 2: 0
1. LITTLE INTEREST OR PLEASURE IN DOING THINGS: NOT AT ALL

## 2024-10-03 ASSESSMENT — PAIN SCALES - WONG BAKER
WONGBAKER_NUMERICALRESPONSE: HURTS LITTLE MORE
WONGBAKER_NUMERICALRESPONSE: NO HURT

## 2024-10-03 ASSESSMENT — PAIN SCALES - GENERAL
PAINLEVEL_OUTOF10: 0 - NO PAIN
PAINLEVEL_OUTOF10: 0 - NO PAIN

## 2024-10-03 ASSESSMENT — PAIN DESCRIPTION - DESCRIPTORS: DESCRIPTORS: ACHING

## 2024-10-03 NOTE — PROGRESS NOTES
Allyssa Gregg is a 79 y.o. female on day 1 of admission presenting with  a fall.     Subjective   She was sitting up in bed.  She has no acute complaints.  She has dementia.  She was oriented to self, able to state her name.  She said her birthday is [birth year] (correct day is Ed[birth year]).  She was not oriented to place or date.    Objective     Physical Exam  General: awake, alert, oriented, responsive  Cardiovascular: regular, normal S1 and S2  Lungs: good air entry bilaterally, clear to auscultation  Extremities: no peripheral cyanosis, no pedal edema  Neuro: alert, oriented x 3, no focal weakness    Last Recorded Vitals  Blood pressure 101/64, pulse 78, temperature 36.5 °C (97.7 °F), temperature source Oral, resp. rate 16, height 1.524 m (5'), weight 63.1 kg (139 lb 1.8 oz), SpO2 95%.  Intake/Output last 3 Shifts:  I/O last 3 completed shifts:  In: 1436.7 (22.8 mL/kg) [P.O.:350; I.V.:86.7 (1.4 mL/kg); IV Piggyback:1000]  Out: - (0 mL/kg)   Weight: 63.1 kg     Relevant Results  Lab Results   Component Value Date    WBC 13.7 (H) 10/03/2024    HGB 15.3 10/03/2024    HCT 43.6 10/03/2024    MCV 91 10/03/2024     10/03/2024     Lab Results   Component Value Date    GLUCOSE 127 (H) 10/03/2024    CALCIUM 8.7 10/03/2024     (L) 10/03/2024    K 3.5 10/03/2024    CO2 23 10/03/2024    CL 96 (L) 10/03/2024    BUN 10 10/03/2024    CREATININE 0.77 10/03/2024     Scheduled medications  aspirin, 81 mg, oral, Daily  atorvastatin, 20 mg, oral, Nightly  donepezil, 10 mg, oral, Nightly  enoxaparin, 40 mg, subcutaneous, Daily  lisinopril, 40 mg, oral, Daily  potassium chloride, 20 mEq, oral, Daily      Continuous medications  potassium chloride in 0.9%NaCl, 100 mL/hr, Last Rate: 100 mL/hr (10/03/24 0705)      PRN medications  PRN medications: acetaminophen, alum-mag hydroxide-simeth, melatonin, ondansetron, ondansetron      Assessment/Plan   Assessment & Plan  Fall, initial encounter  Unwitnessed fall, unknown  circumstances  PT/OT  Hyponatremia  Mild, possibly chronic. On IV normal saline.   Leukocytosis  Probably stress-induced, improving.  Dementia  On donepezil    Discharge planning  PT/OT recommended moderate intensity level of continued care    Justo Hall MD

## 2024-10-03 NOTE — NURSING NOTE
"Pt remains confused, only oriented to self.  Pt removed external cath x2.  It appears that pt is unable to understand the use of the purewick despite frequent education.  Brief currently in place, no incontinent episodes thus far despite ED report stating that pt was incontinent x2 prior to arrival.  Pt frequently states that she want to see her friends downstairs with a few attempts to get out of bed.  Bed alarm in place.  Pt easily reoriented and redirected.  Pt not happy about remaining in her private room overnight but cooperative.  Pt was adamant about going to the bathroom, verbalizing that she \"has to pee\", pt refusing the bedpan despite education of high fall risk and fall prior to arrival to the hospital.  Pt appears agitated and persistent about getting out of bed to use the restroom.  Pt assisted to bedside commode with max assist x2 staff members, pt tolerated poorly.  Pt was able to urinate.  Pt assisted back into bed.  Call light and bedside table within reach.  Will continue to monitor pt.  "

## 2024-10-03 NOTE — NURSING NOTE
Patient's 2 sisters at bedside. Patient's one sister voiced that the patient is known to have daily alcohol consumption. Patient's sister asked this nurse to verify with patient's son who is with the patient more frequently. This nurse notified provider, Dr. Justo Hall.

## 2024-10-03 NOTE — NURSING NOTE
Pt arrived to unit in stable condition.  Pt pleasantly confused, only oriented to self but easily redirected.  Unable to complete admission history d/t pt's disorientation.  Pt denies any pain at this time, no respiratory distress on room air.  Call light and bedside table within reach.  Will continue to closely monitor pt.

## 2024-10-03 NOTE — PROGRESS NOTES
Physical Therapy    Physical Therapy Evaluation    Patient Name: Allyssa Gregg  MRN: [id number]  Department: [address]  Room: [address] [place] [address]  Today's Date: 10/3/2024   Time Calculation  Start Time: 0855  Stop Time: 0917  Time Calculation (min): 22 min    Assessment/Plan   PT Assessment  PT Assessment Results: Decreased strength, Decreased endurance, Impaired balance, Decreased mobility, Decreased coordination, Decreased cognition, Impaired judgement, Decreased safety awareness  Rehab Prognosis: Good  Barriers to Discharge: poor cognition, requiring assistance for mobility, high falls risk  Evaluation/Treatment Tolerance: Patient tolerated treatment well  Medical Staff Made Aware: Yes  Strengths: Premorbid level of function  Barriers to Participation: Comorbidities, Insight into problems  End of Session Communication: Bedside nurse  Assessment Comment: pt with decreased functional mobility, increased weakness, impaired tolerance to activity, and poor safety awareness. pt is a high risk for falls and is functioning below her baseline status. pt will benefit from continued skilled PT services to improve functional performance and maximize safety.  End of Session Patient Position: Bed, 3 rail up, Alarm on (needs in reach)  IP OR SWING BED PT PLAN  Inpatient or Swing Bed: Inpatient  PT Plan  Treatment/Interventions: Bed mobility, Transfer training, Gait training, Balance training, Strengthening, Endurance training, Therapeutic exercise, Therapeutic activity  PT Plan: Ongoing PT  PT Frequency: 3 times per week  PT Discharge Recommendations: Moderate intensity level of continued care  Equipment Recommended upon Discharge: Wheeled walker  PT Recommended Transfer Status: Assist x1, Assistive device (rolling walker)  PT - OK to Discharge: Yes      Subjective   General Visit Information:  General  Reason for Referral: Impaired Mobility  Referred By: Cayla Olivares MD  Past Medical History Relevant to Rehab: Dementia, Hypertension,  Dyslipidemia,  Vitamin D deficiency, Postmenopausal osteoporosis, CVA, COPD, History of alcohol abuse in the past  Family/Caregiver Present: No  Co-Treatment: OT  Co-Treatment Reason: optimization of patient outcomes for a patient with significant cognitive deficits  Prior to Session Communication: Bedside nurse  Patient Position Received: Bed, 3 rail up, Alarm on  General Comment: 79 y.o. female admitted after unwitnessed fall, found down on the ground  Home Living:  Home Living  Type of Home: Apartment  Lives With: Alone  Home Living Comments: Pt is a poor historian, unable to provide home living information. Per chart pt resides in an apartment alone.  Prior Level of Function:  Prior Function Per Pt/Caregiver Report  Receives Help From: Family (Per chart, family checks in daily)  Prior Function Comments: Pt is a poor historian, unable to accurately obtain PLOF.  Precautions:  Precautions  Medical Precautions: Fall precautions    Objective   Pain:  Pain Assessment  Pain Assessment: 0-10  0-10 (Numeric) Pain Score: 0 - No pain  Cognition:  Cognition  Overall Cognitive Status: Impaired at baseline (advanced dementia)  Orientation Level: Disoriented to place, Disoriented to time, Disoriented to situation  Following Commands: Follows one step commands with repetition  Cognition Comments: inappropriate laughter throughout interaction  Insight: Severe    General Assessments:  General Observation  General Observation: pt is pleasantly confused, laughing inappropriately    Activity Tolerance  Endurance: Decreased tolerance for upright activites  Activity Tolerance Comments: fair  Rate of Perceived Exertion (RPE): 3    Sensation  Sensation Comment: denies paresthesias    Strength  Strength Comments: B LEs >3+/5 based on function  Coordination  Coordination Comment: decreased rate and accuracy of movement    Static Sitting Balance  Static Sitting-Balance Support: Feet supported  Static Sitting-Level of Assistance: Close  supervision  Static Sitting-Comment/Number of Minutes: good seated balance    Static Standing Balance  Static Standing-Balance Support: Bilateral upper extremity supported  Static Standing-Level of Assistance: Minimum assistance  Static Standing-Comment/Number of Minutes: fair+ balance with rolling walker  Dynamic Standing Balance  Dynamic Standing-Balance Support: Bilateral upper extremity supported  Dynamic Standing-Level of Assistance: Moderate assistance  Dynamic Standing-Comments: fair balance with ambulation with rolling walker  Functional Assessments:  ADL  ADL's Addressed: No    Bed Mobility  Bed Mobility: Yes  Bed Mobility 1  Bed Mobility 1: Supine to sitting  Level of Assistance 1: Minimum assistance  Bed Mobility Comments 1: tactile cues for initiation of mvoing B LEs to EOB, min A for trunk management.  Bed Mobility 2  Bed Mobility  2: Sitting to supine  Level of Assistance 2: Close supervision  Bed Mobility Comments 2: pt able to manage trunk and B LEs when transferring to supine    Transfers  Transfer: Yes  Transfer 1  Transfer From 1: Bed to, Stand to  Transfer to 1: Stand, Bed  Technique 1: Sit to stand, Stand to sit  Transfer Level of Assistance 1: Minimum assistance  Trials/Comments 1: verbal cues for hand placement and min A to guide trunk up and for eccentric control when sitting  Transfers 2  Transfer From 2: Stand to, Commode-standard to  Transfer to 2: Commode-standard, Stand  Technique 2: Stand to sit, Sit to stand  Transfer Device 2: Walker  Transfer Level of Assistance 2: Minimum assistance  Trials/Comments 2: constant cues for sequencing and walker management, min A to safely guide trunk down to commode, verbal cues to use grab bar to help pull herself to standing.    Ambulation/Gait Training  Ambulation/Gait Training Performed: Yes  Ambulation/Gait Training 1  Surface 1: Level tile  Device 1: Rolling walker  Assistance 1: Minimum assistance  Quality of Gait 1: Wide base of support,  Decreased step length, Forward flexed posture  Comments/Distance (ft) 1: 20'x2, min A for balance, safety, and walker proximity, constant verbal and tactile cues along with manual guidance of walker.  Extremity/Trunk Assessments:  RLE   RLE : Within Functional Limits  LLE   LLE : Within Functional Limits  Outcome Measures:  [facility] Basic Mobility  Turning from your back to your side while in a flat bed without using bedrails: A little  Moving from lying on your back to sitting on the side of a flat bed without using bedrails: A little  Moving to and from bed to chair (including a wheelchair): A little  Standing up from a chair using your arms (e.g. wheelchair or bedside chair): A little  To walk in hospital room: A lot  Climbing 3-5 steps with railing: A lot  Basic Mobility - Total Score: 16    Encounter Problems       Encounter Problems (Active)       Mobility       STG - Patient will ambulate 100' with rolling walker and close supervision       Start:  10/03/24    Expected End:  10/31/24               PT Transfers       STG - Transfer from bed to chair with close supervision       Start:  10/03/24    Expected End:  10/31/24            STG - Patient to transfer to and from sit to supine with close supervision       Start:  10/03/24    Expected End:  10/31/24            STG - Patient will transfer sit to and from stand with close supervision       Start:  10/03/24    Expected End:  10/31/24                   Education Documentation  Mobility Training, taught by Maxine Harding, PT at 10/3/2024  1:56 PM.  Learner: Patient  Readiness: Acceptance  Method: Explanation  Response: Needs Reinforcement    Education Comments  No comments found.

## 2024-10-03 NOTE — CARE PLAN
The patient's goals for the shift include rest    The clinical goals for the shift include Safety, no falls

## 2024-10-03 NOTE — NURSING NOTE
Bed side shift report received. Patient resting comfortably. Reports no current concerns at this time. Call light within reach. This nurse assumed care. Bed alarm active. IV fluids infusing. Patient confused. Only oriented to self at this time.

## 2024-10-03 NOTE — PROGRESS NOTES
[facility] called pts son Ryan [phone] due to pts dementia. Ryan confirms pt does live along in an apartment and family including three children and two sisters check on her daily however they all are aware that at this point pt is needing to transition into a long term care facility of some sort be that assisted living or at a SNF. In the short term this worker explained that due to pt not having ever completed HPOA or LW that decision making will fall to an agreement between her three children. Pts PCP is Dr Jayshree Phillips. Discussion around best dc plan with this worker stating a stay at a rehab facility will probably be warranted and would also allow for the family to work on a suitable long term care plan. [facility] made son aware of the difference in payor for assisted living vs SNF, gave him information for Deysi Ash at [facility] who can assist the family in finding a suitable long term care option. In the meantime [facility] emailing list of SNF's to Ryan at [email or web address] and [email or web address]. Precert is needed.      10/03/24 1027   Discharge Planning   Living Arrangements Alone   Support Systems Children;Family members;Friends/neighbors  (Pt has two sisters and three children, all of whom have checked in on her regularly and provide support.)   Assistance Needed Yes, probably placement.   Type of Residence Private residence   Number of Stairs to Enter Residence 0   Number of Stairs Within Residence 0   Do you have animals or pets at home? Yes   Type of Animals or Pets Cat   Who is requesting discharge planning? Provider   Home or Post Acute Services Post acute facilities (Rehab/SNF/etc)   Type of Post Acute Facility Services Rehab;Skilled nursing   Expected Discharge Disposition SNF   Does the patient need discharge transport arranged? Yes   RoundTrip coordination needed? Yes   Financial Resource Strain   How hard is it for you to pay for the very basics like food, housing,  medical care, and heating? Not hard   Housing Stability   In the last 12 months, was there a time when you were not able to pay the mortgage or rent on time? N   At any time in the past 12 months, were you homeless or living in a shelter (including now)? N   Transportation Needs   In the past 12 months, has lack of transportation kept you from medical appointments or from getting medications? no   In the past 12 months, has lack of transportation kept you from meetings, work, or from getting things needed for daily living? No

## 2024-10-03 NOTE — PROGRESS NOTES
Occupational Therapy    Evaluation/Treatment    Patient Name: Allyssa Gregg  MRN: [id number]  Department: [facility]  Room: [address] [place] [address]  Today's Date: 10/03/24  Time Calculation  Start Time: 0853  Stop Time: 0916  Time Calculation (min): 23 min       Assessment:  OT Assessment: Pt would benefit from acute OT services to address deficits in ADLs, functional mobility, and transfers  End of Session Communication: Bedside nurse  End of Session Patient Position: Bed, 3 rail up, Alarm on  OT Assessment Results: Decreased ADL status, Decreased upper extremity strength, Decreased safe judgment during ADL, Decreased cognition, Decreased endurance, Decreased functional mobility  Strengths: Support of extended family/friends  Barriers to Participation: Comorbidities  Plan:  Treatment Interventions: ADL retraining, Functional transfer training, UE strengthening/ROM, Patient/family training, Equipment evaluation/education  OT Frequency: 3 times per week  OT Discharge Recommendations: Moderate intensity level of continued care  OT - OK to Discharge: Yes  Treatment Interventions: ADL retraining, Functional transfer training, UE strengthening/ROM, Patient/family training, Equipment evaluation/education    Subjective   Current Problem:  1. Fall, initial encounter        2. Nausea and vomiting, unspecified vomiting type        3. Weakness          General:   OT Received On: 10/03/24  General  Reason for Referral: Impaired ADLs. Pt admitted after unwitnessed fall, found down on the ground  Referred By: Cayla Olivares MD  Past Medical History Relevant to Rehab: Dementia, Hypertension, Dyslipidemia,  Vitamin D deficiency, Postmenopausal osteoporosis, CVA, COPD, History of alcohol abuse in the past  Family/Caregiver Present: No  Co-Treatment: PT  Prior to Session Communication: Bedside nurse  Patient Position Received: Bed, 3 rail up, Alarm on  General Comment: Cleared by nursing. Pt pleasantly confused but agreeable to  therapy  Precautions:  Medical Precautions: Fall precautions    Pain:  Pain Assessment  Pain Assessment: 0-10  0-10 (Numeric) Pain Score: 0 - No pain    Objective   Cognition:  Overall Cognitive Status: Impaired at baseline (h/o dementia)  Orientation Level: Disoriented to place, Disoriented to time, Disoriented to situation  Following Commands: Follows one step commands with repetition  Memory:  (impaired)  Safety/Judgement:  (impaired)  Insight: Severe         Home Living:  Type of Home: Apartment  Lives With: Alone  Home Access: No concerns  Home Living Comments: Pt is a poor historian, unable to provide home living information. Per chart pt resides in an apartment alone.  Prior Function:  Receives Help From: Family (Per chart, family checks in daily)  Prior Function Comments: Pt is a poor historian, unable to accurately obtain PLOF.     ADL:  Eating Assistance: Minimal  Grooming Assistance: Minimal  Bathing Assistance: Moderate  UE Dressing Assistance: Minimal  LE Dressing Assistance: Moderate  Toileting Assistance with Device: Minimal  Activities of Daily Living:      LE Dressing  LE Dressing: Yes  Adult Briefs Level of Assistance: Moderate assistance  LE Dressing Where Assessed: Toilet  LE Dressing Comments: doffing/donning brief    Toileting  Toileting Level of Assistance: Close supervision  Where Assessed: Toilet  Toileting Comments: lydia care performed in seated  Activity Tolerance:  Endurance: Decreased tolerance for upright activites     Bed Mobility/Transfers: Bed Mobility  Bed Mobility:  (min A for trunk upright supine>seated EOB; min A for lifting RLE on to bed seated EOB>supine)    Transfers  Transfer:  (mod A for balance/controlled descent sit<>stand bed level, VCs for safe hand and leg placement)    Toilet Transfers  Toilet Transfer Type: To and from  Toilet Transfer to: Standard toilet  Toilet Transfers: Moderate assistance  Toilet Transfers Comments: transfer on/off toilet x2 trials, VCs for use  of grab bar     Functional Mobility:  Functional Mobility  Functional Mobility Performed:  (mod A for balance for functional mobility short household distance with use of RW, frequent VCs for safe use of RW)  Sitting Balance:  Static Sitting Balance  Static Sitting-Level of Assistance: Close supervision    Sensation:  Sensation Comment: pt denied numbness/paresthesia BUEs  Strength:  Strength Comments: WFL during functional activity    Hand Function:  Hand Function  Gross Grasp: Functional  Coordination: Functional  Extremities: RUE   RUE : Within Functional Limits and LUE   LUE: Within Functional Limits    Outcome Measures: [facility] Daily Activity  Putting on and taking off regular lower body clothing: A lot  Bathing (including washing, rinsing, drying): A lot  Putting on and taking off regular upper body clothing: A little  Toileting, which includes using toilet, bedpan or urinal: A lot  Taking care of personal grooming such as brushing teeth: A little  Eating Meals: A little  Daily Activity - Total Score: 15    Education Documentation  ADL Training, taught by Beti Mclaughlin OT at 10/3/2024 10:55 AM.  Learner: Patient  Readiness: Acceptance  Method: Explanation, Demonstration  Response: Needs Reinforcement    Education Comments  No comments found.    Goals:  Encounter Problems       Encounter Problems (Active)       ADLs       Pt will complete ADL tasks at supervision with use of AE prn  (Progressing)       Start:  10/03/24    Expected End:  10/31/24               Functional Mobility       Pt will perform functional mobility household distances at supervision with use of RW.    (Progressing)       Start:  10/03/24    Expected End:  10/31/24               OT Transfers       Pt will perform functional transfers at supervision. (Progressing)       Start:  10/03/24    Expected End:  10/31/24

## 2024-10-03 NOTE — NURSING NOTE
This nurse rounded on patient. Patient resting comfortably. Patient voices no concerns at this time. Range of motion preformed. This nurse help set up patients food tray. Patient environment safe. Call light within reach.

## 2024-10-03 NOTE — NURSING NOTE
This nurse rounded on patient. Patient resting comfortably. Patient voices no concerns at this time. Patient environment safe. Call light within reach. Bed alarm active. Patient's iV fluids running. Patient watching TV.

## 2024-10-03 NOTE — CARE PLAN
The patient's goals for the shift include rest    The clinical goals for the shift include safety      Problem: Fall/Injury  Goal: Not fall by end of shift  Outcome: Progressing  Goal: Be free from injury by end of the shift  Outcome: Progressing  Goal: Verbalize understanding of personal risk factors for fall in the hospital  Outcome: Progressing  Goal: Verbalize understanding of risk factor reduction measures to prevent injury from fall in the home  Outcome: Progressing  Goal: Use assistive devices by end of the shift  Outcome: Progressing  Goal: Pace activities to prevent fatigue by end of the shift  Outcome: Progressing     Problem: Skin  Goal: Decreased wound size/increased tissue granulation at next dressing change  Outcome: Progressing  Goal: Participates in plan/prevention/treatment measures  Outcome: Progressing  Goal: Prevent/manage excess moisture  Outcome: Progressing  Goal: Prevent/minimize sheer/friction injuries  Outcome: Progressing  Goal: Promote/optimize nutrition  Outcome: Progressing  Goal: Promote skin healing  Outcome: Progressing

## 2024-10-03 NOTE — NURSING NOTE
This nurse called patient's son Ryan in an attempt to update patient admission data in chart. This nurse left a voicemail asking for a call back. Contact info left in voicemail.

## 2024-10-03 NOTE — H&P
History Of Present Illness  Allyssa Gregg is a 79 y.o. female presenting with fall.  Has an advanced dementia, she does not remember falling.  She does not remember anything what happened.  History was obtained from ER personnel: Patient lives alone, she fell last night and was not able to get up.  She was found by person from [employer] on the floor with some vomit around.  Patient was very weak and she was brought to the emergency department.  During exam, patient denies any chest pain, cough, abdominal pain, nausea.  Past Medical History  Dementia  Hypertension  Dyslipidemia  Vitamin D deficiency  Postmenopausal osteoporosis  CVA  COPD  History of alcohol abuse in the past  Surgical History  She has a past surgical history that includes MR angio head wo IV contrast (10/22/2016).     Social History  According to the chart, no history of smoking.  Patient has a history of alcohol use in the past.    Family History  No family history on file.  Patient does not remember  Allergies  Fish containing products and Bertie    Review of Systems  Patient cannot provide  Physical Exam  Location: Room 450.  Pi is in no apparent distress.  Pleasant white female.  Cooperative with exam.  Nontoxic-appearing.  Comfortable at rest on room air.  Skin is clean, dry.  No skin lesions, rashes, ecchymosis.  Head is atraumatic, normocephalic.  Mouth mucosa is pink and moist.  No mucosal lesions.  No nasal discharge.  Musculoskeletal: No deformities, no muscle swelling.  No point tenderness to palpation.  Neck is supple, no JVD, no carotid bruits.  No lymphadenopathy.  Chest is atraumatic.  No tenderness to palpation.  Lungs are clear to auscultation bilaterally.  Diminished at bases no wheezes, no rales, no rhonchi.  Heart: Regular rate and rhythm S1-S2.  No murmurs, rubs, gallops.  Peripheral pulses are palpable in all extremities, equal.  Abdomen is soft, not tender, not distended.  Bowel sounds positive in all quadrants.  No rebound,  no guarding.  Rectal exam deferred.  Extremities: No peripheral edema, cords, cyanosis, varices.  Neuro: Patient is alert, oriented to name only. moves all extremities.  No gross focal neurological deficits.  Face is symmetrical.  Tongue is midline.  No visual abnormalities.  No dysarthria or aphasia.  Psychiatric: Patient is cooperative with exam, maintains good eye contact.  No evidence of psychosis, suicidal ideation or depression.   Last Recorded Vitals  /81 (BP Location: Right arm, Patient Position: Lying)   Pulse 106   Temp 36.4 °C (97.5 °F) (Oral)   Resp 17   Wt 64.3 kg (141 lb 12.1 oz)   SpO2 97%     Relevant Results        CT abdomen pelvis w IV contrast    Result Date: 10/2/2024  Interpreted By:  Michael Hoover, STUDY: CT ABDOMEN PELVIS W IV CONTRAST;  10/2/2024 8:58 pm   INDICATION: Signs/Symptoms:abdominal pain, vomiting.     COMPARISON: None.   ACCESSION NUMBER(S): [id number]   ORDERING CLINICIAN: SHANT ROGERS   TECHNIQUE: CT of the abdomen and pelvis was performed.  Standard contiguous axial images were obtained at 3 mm slice thickness through the abdomen and pelvis. Coronal and sagittal reconstructions at 3 mm slice thickness were performed.   75 ml of contrast Omnipaque were administered intravenously without immediate complication.   FINDINGS: LOWER CHEST: There are dependent changes noted. No pleural effusion.   ABDOMEN:   LIVER: The liver is normal in size without evidence of focal liver lesions.   BILE DUCTS: The intrahepatic and extrahepatic ducts are not dilated.   GALLBLADDER: The gallbladder is present.   PANCREAS: The pancreas appears unremarkable without evidence of ductal dilatation or masses.   SPLEEN: The spleen is normal in size.   ADRENAL GLANDS: Bilateral adrenal glands appear normal.   KIDNEYS AND URETERS: The kidneys are normal in size and enhance symmetrically.   PELVIS:   BLADDER: Within normal limits.   REPRODUCTIVE ORGANS: The uterus is present.   BOWEL: The  stomach, small and large bowel are normal in appearance without wall thickening or obstruction.   There is diverticulosis with no CT evidence of diverticulitis. The appendix appears normal.   VESSELS: No aortic aneurysm. There is scattered atherosclerotic disease noted.   PERITONEUM/RETROPERITONEUM/LYMPH NODES: No ascites or free air, no fluid collection.  No enlarged mesenteric lymph nodes.   BONES AND ABDOMINAL WALL: No suspicious osseous lesions are identified. Degenerative discogenic disease is noted in the lower thoracic and lumbar spine.  Small umbilical hernia containing mesenteric fat.       1.  No evidence of acute intra-abdominal pathology.     MACRO: None   Signed by: Michael Hoover 10/2/2024 9:16 PM Dictation workstation:   [id number]    CT head wo IV contrast    Result Date: 10/2/2024  Interpreted By:  Michael Hoover, STUDY: CT HEAD WO IV CONTRAST;  10/2/2024 8:57 pm   INDICATION: Signs/Symptoms:fall, head injury, vomiting, rule out ICH.     COMPARISON: 04/11/2018   ACCESSION NUMBER(S): [id number]   ORDERING CLINICIAN: SHANT ROGERS   TECHNIQUE: Noncontrast axial CT scan of head was performed. Angled reformats in brain and bone windows were generated. The images were reviewed in bone, brain, blood and soft tissue windows.   FINDINGS: CSF Spaces: The ventricles, sulci and basal cisterns are within normal limits. There is no extraaxial fluid collection.   Parenchyma: There are periventricular white matter changes noted. The grey-white differentiation is intact. There is no mass effect or midline shift.  There is no intracranial hemorrhage.   Calvarium: The calvarium is unremarkable.   Paranasal sinuses and mastoids: Visualized paranasal sinuses and mastoids are clear.       No evidence of acute cortical infarct or intracranial hemorrhage.   MACRO: None   Signed by: Michael Hoover 10/2/2024 9:05 PM Dictation workstation:   [id number]     Results for orders placed or performed during the hospital  encounter of 10/02/24 (from the past 24 hour(s))   CBC and Auto Differential   Result Value Ref Range    WBC 14.0 (H) 4.4 - 11.3 x10*3/uL    nRBC 0.0 0.0 - 0.0 /100 WBCs    RBC 5.60 (H) 4.00 - 5.20 x10*6/uL    Hemoglobin 18.1 (H) 12.0 - 16.0 g/dL    Hematocrit 52.6 (H) 36.0 - 46.0 %    MCV 94 80 - 100 fL    MCH 32.3 26.0 - 34.0 pg    MCHC 34.4 32.0 - 36.0 g/dL    RDW 13.3 11.5 - 14.5 %    Platelets 292 150 - 450 x10*3/uL    Neutrophils % 89.6 40.0 - 80.0 %    Immature Granulocytes %, Automated 0.4 0.0 - 0.9 %    Lymphocytes % 6.6 13.0 - 44.0 %    Monocytes % 3.1 2.0 - 10.0 %    Eosinophils % 0.1 0.0 - 6.0 %    Basophils % 0.2 0.0 - 2.0 %    Neutrophils Absolute 12.53 (H) 1.60 - 5.50 x10*3/uL    Immature Granulocytes Absolute, Automated 0.05 0.00 - 0.50 x10*3/uL    Lymphocytes Absolute 0.93 0.80 - 3.00 x10*3/uL    Monocytes Absolute 0.44 0.05 - 0.80 x10*3/uL    Eosinophils Absolute 0.02 0.00 - 0.40 x10*3/uL    Basophils Absolute 0.03 0.00 - 0.10 x10*3/uL   Urinalysis with Reflex Culture and Microscopic   Result Value Ref Range    Color, Urine Colorless (N) Light-Yellow, Yellow, Dark-Yellow    Appearance, Urine Clear Clear    Specific Gravity, Urine 1.008 1.005 - 1.035    pH, Urine 6.5 5.0, 5.5, 6.0, 6.5, 7.0, 7.5, 8.0    Protein, Urine NEGATIVE NEGATIVE, 10 (TRACE), 20 (TRACE) mg/dL    Glucose, Urine 70 (1+) (A) Normal mg/dL    Blood, Urine NEGATIVE NEGATIVE    Ketones, Urine 10 (1+) (A) NEGATIVE mg/dL    Bilirubin, Urine NEGATIVE NEGATIVE    Urobilinogen, Urine Normal Normal mg/dL    Nitrite, Urine NEGATIVE NEGATIVE    Leukocyte Esterase, Urine NEGATIVE NEGATIVE   Basic metabolic panel   Result Value Ref Range    Glucose 143 (H) 74 - 99 mg/dL    Sodium 129 (L) 136 - 145 mmol/L    Potassium 3.1 (L) 3.5 - 5.3 mmol/L    Chloride 90 (L) 98 - 107 mmol/L    Bicarbonate 26 21 - 32 mmol/L    Anion Gap 16 10 - 20 mmol/L    Urea Nitrogen 10 6 - 23 mg/dL    Creatinine 0.80 0.50 - 1.05 mg/dL    eGFR 75 >60 mL/min/1.73m*2     Calcium 9.1 8.6 - 10.3 mg/dL   Creatine Kinase   Result Value Ref Range    Creatine Kinase 240 (H) 0 - 215 U/L   Lipase   Result Value Ref Range    Lipase 20 9 - 82 U/L         Assessment/Plan   Assessment & Plan  Fall, initial encounter      Fall, patient does not remember what happened.  Consult physical and Occupational Therapy.  Dehydration.  Clinically patient is dehydrated, IV hydration.  Hyponatremia, hypokalemia, likely related to dehydration from low oral intake and emesis.  Continue IV fluids with potassium.  Monitor electrolytes  Dementia, advanced  Leukocytosis, likely reactive.  No evidence of pneumonia or urinary tract infection, no evidence of any infectious process.  Will monitor white count  DVT prophylaxis, heparin  Patient will, likely, need to go to rehab and probably to long-term facility.  Consult social service       Cayla Olivares MD

## 2024-10-03 NOTE — NURSING NOTE
Patient found attempting to leave the bed. Fellow nurse assisted patient to bathroom.  Pressure relief overlay placed on mattress and complete linens were changed

## 2024-10-03 NOTE — NURSING NOTE
Pt remained confused throughout the night, only oriented to self.  Pt slept for a few hours.  No more attempts to get out of bed since previous episode when she had to use the restroom.  During 0600 rounds, pt's IV and kerlix wrap were found folded up nicely on pt's bedside table.  Pt currently sleeping.  No active bleeding from IV site.  Will continue to closely monitor pt.

## 2024-10-03 NOTE — NURSING NOTE
New peripheral IV placed.  IVF restarted.  Pt tolerated well.  PIV wrapped in kerlix.  Will continue to monitor pt.

## 2024-10-04 PROBLEM — F10.90 ALCOHOL USE: Status: ACTIVE | Noted: 2024-10-04

## 2024-10-04 PROBLEM — Z78.9 ALCOHOL USE: Status: ACTIVE | Noted: 2024-10-04

## 2024-10-04 LAB
CK SERPL-CCNC: 189 U/L (ref 0–215)
ERYTHROCYTE [DISTWIDTH] IN BLOOD BY AUTOMATED COUNT: 13.8 % (ref 11.5–14.5)
HCT VFR BLD AUTO: 43.9 % (ref 36–46)
HGB BLD-MCNC: 14.7 G/DL (ref 12–16)
MAGNESIUM SERPL-MCNC: 1.7 MG/DL (ref 1.6–2.4)
MCH RBC QN AUTO: 32 PG (ref 26–34)
MCHC RBC AUTO-ENTMCNC: 33.5 G/DL (ref 32–36)
MCV RBC AUTO: 96 FL (ref 80–100)
NRBC BLD-RTO: 0 /100 WBCS (ref 0–0)
PLATELET # BLD AUTO: 173 X10*3/UL (ref 150–450)
RBC # BLD AUTO: 4.59 X10*6/UL (ref 4–5.2)
WBC # BLD AUTO: 10 X10*3/UL (ref 4.4–11.3)

## 2024-10-04 PROCEDURE — 36415 COLL VENOUS BLD VENIPUNCTURE: CPT | Performed by: INTERNAL MEDICINE

## 2024-10-04 PROCEDURE — 99232 SBSQ HOSP IP/OBS MODERATE 35: CPT | Performed by: INTERNAL MEDICINE

## 2024-10-04 PROCEDURE — 2500000004 HC RX 250 GENERAL PHARMACY W/ HCPCS (ALT 636 FOR OP/ED): Performed by: INTERNAL MEDICINE

## 2024-10-04 PROCEDURE — 82550 ASSAY OF CK (CPK): CPT | Performed by: INTERNAL MEDICINE

## 2024-10-04 PROCEDURE — 2500000001 HC RX 250 WO HCPCS SELF ADMINISTERED DRUGS (ALT 637 FOR MEDICARE OP): Performed by: INTERNAL MEDICINE

## 2024-10-04 PROCEDURE — 97535 SELF CARE MNGMENT TRAINING: CPT | Mod: GO,CO

## 2024-10-04 PROCEDURE — 1100000001 HC PRIVATE ROOM DAILY

## 2024-10-04 PROCEDURE — 2500000001 HC RX 250 WO HCPCS SELF ADMINISTERED DRUGS (ALT 637 FOR MEDICARE OP): Performed by: REGISTERED NURSE

## 2024-10-04 PROCEDURE — 97530 THERAPEUTIC ACTIVITIES: CPT | Mod: GO,CO

## 2024-10-04 PROCEDURE — 85027 COMPLETE CBC AUTOMATED: CPT | Performed by: INTERNAL MEDICINE

## 2024-10-04 PROCEDURE — 83735 ASSAY OF MAGNESIUM: CPT | Performed by: INTERNAL MEDICINE

## 2024-10-04 RX ORDER — DIPHENHYDRAMINE HCL 25 MG
25 TABLET ORAL EVERY 6 HOURS PRN
Status: DISPENSED | OUTPATIENT
Start: 2024-10-04

## 2024-10-04 RX ORDER — LANOLIN ALCOHOL/MO/W.PET/CERES
100 CREAM (GRAM) TOPICAL DAILY
Status: DISPENSED | OUTPATIENT
Start: 2024-10-04

## 2024-10-04 RX ADMIN — ASPIRIN 81 MG: 81 TABLET, COATED ORAL at 08:51

## 2024-10-04 RX ADMIN — ATORVASTATIN CALCIUM 20 MG: 20 TABLET, FILM COATED ORAL at 20:47

## 2024-10-04 RX ADMIN — POTASSIUM CHLORIDE 20 MEQ: 1.5 FOR SOLUTION ORAL at 08:51

## 2024-10-04 RX ADMIN — SODIUM CHLORIDE AND POTASSIUM CHLORIDE 100 ML/HR: 9; 1.49 INJECTION, SOLUTION INTRAVENOUS at 06:43

## 2024-10-04 RX ADMIN — LISINOPRIL 40 MG: 40 TABLET ORAL at 08:51

## 2024-10-04 RX ADMIN — DONEPEZIL HYDROCHLORIDE 10 MG: 10 TABLET, FILM COATED ORAL at 20:47

## 2024-10-04 RX ADMIN — Medication 100 MG: at 16:24

## 2024-10-04 RX ADMIN — DIPHENHYDRAMINE HYDROCHLORIDE 25 MG: 25 TABLET ORAL at 01:10

## 2024-10-04 SDOH — ECONOMIC STABILITY: HOUSING INSECURITY: IN THE PAST 12 MONTHS, HOW MANY TIMES HAVE YOU MOVED WHERE YOU WERE LIVING?: 0

## 2024-10-04 ASSESSMENT — PAIN - FUNCTIONAL ASSESSMENT
PAIN_FUNCTIONAL_ASSESSMENT: 0-10

## 2024-10-04 ASSESSMENT — COGNITIVE AND FUNCTIONAL STATUS - GENERAL
PERSONAL GROOMING: A LITTLE
DRESSING REGULAR LOWER BODY CLOTHING: A LOT
MOBILITY SCORE: 19
CLIMB 3 TO 5 STEPS WITH RAILING: A LITTLE
TOILETING: A LITTLE
WALKING IN HOSPITAL ROOM: A LITTLE
TURNING FROM BACK TO SIDE WHILE IN FLAT BAD: A LITTLE
EATING MEALS: A LITTLE
HELP NEEDED FOR BATHING: A LITTLE
DAILY ACTIVITIY SCORE: 19
HELP NEEDED FOR BATHING: A LOT
TOILETING: A LITTLE
DAILY ACTIVITIY SCORE: 16
DRESSING REGULAR LOWER BODY CLOTHING: A LITTLE
DRESSING REGULAR UPPER BODY CLOTHING: A LITTLE
DRESSING REGULAR UPPER BODY CLOTHING: A LITTLE
PERSONAL GROOMING: A LITTLE
MOVING FROM LYING ON BACK TO SITTING ON SIDE OF FLAT BED WITH BEDRAILS: A LITTLE
STANDING UP FROM CHAIR USING ARMS: A LITTLE

## 2024-10-04 ASSESSMENT — PAIN SCALES - GENERAL
PAINLEVEL_OUTOF10: 0 - NO PAIN

## 2024-10-04 ASSESSMENT — ACTIVITIES OF DAILY LIVING (ADL): HOME_MANAGEMENT_TIME_ENTRY: 12

## 2024-10-04 NOTE — NURSING NOTE
Bed alarm sounding. Pt sitting very close to Rt rail. Pt able to shift hips and legs more into center of bed. Pt continues to ensure no needs at this time. Call light within reach, bed alarm engaged. Plan of care ongoing.

## 2024-10-04 NOTE — CONSULTS
Nutrition Assessement Note    Nutrition Assessment    Reason for Assessment: Admission nursing screening    Pt only alert to self, no family members in room. Pt was found by EMS next to her bed and brown emesis. Pt has ate well since admission. Will monitor PO intakes    Reason for Hospital Admission:  Allyssa Gregg is a 79 y.o. female who is admitted for fall.     No past medical history on file.   Past Surgical History:   Procedure Laterality Date    MR HEAD ANGIO WO IV CONTRAST  10/22/2016    MR HEAD ANGIO WO IV CONTRAST [facility]       Nutrition History:     Energy Intake: Good > 75 %     Food Allergies/Intolerances:  None  GI Symptoms: Vomiting    Anthropometrics:  Ht: 152.4 cm (5'), Wt: 63.1 kg (139 lb 1.8 oz), BMI: 27.17             Weight Change:  Daily Weight  10/03/24 : 63.1 kg (139 lb 1.8 oz)  04/25/24 : 65.3 kg (144 lb)  10/26/23 : 63.5 kg (140 lb)  08/18/22 : 64 kg (141 lb)  02/08/22 : 66.2 kg (146 lb)  04/08/21 : 68.9 kg (152 lb)  10/08/20 : 63.3 kg (139 lb 9.6 oz)     Weight History / % Weight Change: records show stable weight for 4 years             Nutrition Focused Physical Exam Findings:                       Nutrition Significant Labs:  Lab Results   Component Value Date    WBC 10.0 10/04/2024    HGB 14.7 10/04/2024    HCT 43.9 10/04/2024     10/04/2024    CHOL 151 10/26/2023    TRIG 97 10/26/2023    HDL 71.0 10/26/2023    ALT 15 10/26/2023    AST 22 10/26/2023     (L) 10/03/2024    K 3.5 10/03/2024    CL 96 (L) 10/03/2024    CREATININE 0.77 10/03/2024    BUN 10 10/03/2024    CO2 23 10/03/2024    TSH 1.41 10/03/2024    INR 1.0 04/11/2018     Nutrition Specific Medications:  aspirin, 81 mg, oral, Daily  atorvastatin, 20 mg, oral, Nightly  donepezil, 10 mg, oral, Nightly  enoxaparin, 40 mg, subcutaneous, Daily  lisinopril, 40 mg, oral, Daily  potassium chloride, 20 mEq, oral, Daily      potassium chloride in 0.9%NaCl, 100 mL/hr, Last Rate: 100 mL/hr (10/04/24  0643)      Dietary Orders (From admission, onward)       Start     Ordered    10/02/24 2135  Adult diet Regular  Diet effective now        Question:  Diet type  Answer:  Regular    10/02/24 2134                  Estimated Needs:   Estimated Energy Needs  Total Energy Estimated Needs (kCal): 1578 kCal  Total Estimated Energy Need per Day (kCal/kg): 25 kCal/kg  Method for Estimating Needs: actual wt    Estimated Protein Needs  Total Protein Estimated Needs (g): 63 g  Total Protein Estimated Needs (g/kg): 1 g/kg  Method for Estimating Needs: actual wt    Estimated Fluid Needs  Total Fluid Estimated Needs (mL): 1578 mL  Method for Estimating Needs: 1 mL/kcal        Nutrition Diagnosis   Nutrition Diagnosis:       Nutrition Diagnosis  Patient has Nutrition Diagnosis: No       Nutrition Interventions/Recommendations   Nutrition Interventions and Recommendations:    Nutrition Prescription:  Individualized Nutrition Prescription Provided for : 1578 kcals, 63 g protein via diet    Nutrition Interventions:   Food and/or Nutrient Delivery Interventions  Interventions: Meals and snacks  Meals and Snacks: General healthful diet  Goal: provide diet as ordered    Education Documentation  No documentation found.           Nutrition Monitoring and Evaluation   Monitoring/Evaluation:   Food/Nutrient Related History Monitoring  Monitoring and Evaluation Plan: Energy intake  Energy Intake: Estimated energy intake  Criteria: pt to consume >/= 75% estimated needs            Time Spent/Follow-up:   Follow Up  Time Spent (min): 30 minutes  Last Date of Nutrition Visit: 10/04/24  Nutrition Follow-Up Needed?: 3-5 days  Follow up Comment: 10/8/24

## 2024-10-04 NOTE — PROGRESS NOTES
Occupational Therapy    OT Treatment    Patient Name: Allyssa Gregg  MRN: [id number]  Department: [address]  Room: [address] [place] [address]  Today's Date: 10/4/2024  Time Calculation  Start Time: 1015  Stop Time: 1038  Time Calculation (min): 23 min        Assessment:  OT Assessment: Tolerated session well, demonstrationg progression towards POC with increased cues required for safety awareness throughout tx. Pt would benefit from continued skilled OT services to improve strength, balance, and functional tolerance to increase independence with ADL tasks  End of Session Communication: Bedside nurse  End of Session Patient Position: Up in chair, Alarm on  OT Assessment Results: Decreased ADL status, Decreased upper extremity strength, Decreased safe judgment during ADL, Decreased cognition, Decreased endurance, Decreased functional mobility  Plan:  Treatment Interventions: ADL retraining, Functional transfer training, UE strengthening/ROM, Patient/family training, Equipment evaluation/education  OT Frequency: 3 times per week  OT Discharge Recommendations: Moderate intensity level of continued care  OT - OK to Discharge: Yes  Treatment Interventions: ADL retraining, Functional transfer training, UE strengthening/ROM, Patient/family training, Equipment evaluation/education    Subjective   Previous Visit Info:  OT Last Visit  OT Received On: 10/04/24  General:  General  Prior to Session Communication: Bedside nurse  Patient Position Received: Alarm on, Bed, 3 rail up  General Comment: Cleared for therapy per RN. Pt seated EOB with alarm sounding attempting to get out of bed, and agreeable to tx  Precautions:  Medical Precautions: Fall precautions    Pain:  Pain Assessment  Pain Assessment: 0-10  0-10 (Numeric) Pain Score: 0 - No pain    Objective    Cognition:  Cognition  Overall Cognitive Status: Impaired at baseline  Orientation Level: Disoriented to situation, Disoriented to place  Following Commands: Follows one step commands with  repetition  Cognition Comments: inappropriate laughter throughout tx  Safety/Judgement: Exceptions to WFL  Insight: Severe  Impulsive: Moderately  Processing Speed: Delayed    Activities of Daily Living:    Grooming  Grooming Level of Assistance: Setup, Close supervision  Grooming Where Assessed: Chair  Grooming Comments: face washing, oral hygiene, and hair brushing tasks with cues for task efficiency    Toileting  Toileting Level of Assistance: Close supervision  Where Assessed: Bedside commode  Toileting Comments: increased time required for toileting task, pt able to complete pericare in standing    Bed Mobility/Transfers: Bed Mobility  Bed Mobility: Yes  Bed Mobility 1  Bed Mobility 1: Scooting  Level of Assistance 1: Minimum assistance  Bed Mobility Comments 1: assist to scoot hips to EOB    Transfers  Transfer: Yes  Transfer 1  Technique 1: Sit to stand, Stand to sit  Transfer Device 1: Walker  Transfer Level of Assistance 1: Minimum assistance, Minimal verbal cues  Trials/Comments 1: assist for trunk elevation and balance with cues for proper hand placement, demonstrating decreased eccentric lowering to chair  Transfers 2  Technique 2: Sit to stand, Stand to sit  Transfer Device 2: Walker  Transfer Level of Assistance 2: Minimum assistance  Trials/Comments 2: assist for balance with cues for proper hand placement and eccentric lowering    Toilet Transfers  Toilet Transfer Type: To and from  Toilet Transfer to: Standard bedside commode  Toilet Transfers: Minimal assistance  Toilet Transfers Comments: assist for trunk elevation and eccentric lowering with cues for proper hand placement    Functional Mobility:  Functional Mobility  Functional Mobility Performed: Yes  Functional Mobility 1  Device 1: Rolling walker  Assistance 1: Minimum assistance  Comments 1: functional mobility short household distance at FWW with cues for pacing and postural alignment, demonstrating fair- balance    Standing  Balance:  Dynamic Standing Balance  Dynamic Standing-Level of Assistance: Contact guard  Dynamic Standing-Balance: Forward lean, Reaching for objects  Dynamic Standing-Comments: fair balance during pericare hygiene    Outcome Measures:[facility] Daily Activity  Putting on and taking off regular lower body clothing: A lot  Bathing (including washing, rinsing, drying): A lot  Putting on and taking off regular upper body clothing: A little  Toileting, which includes using toilet, bedpan or urinal: A little  Taking care of personal grooming such as brushing teeth: A little  Eating Meals: A little  Daily Activity - Total Score: 16    Education Documentation  ADL Training, taught by RADHA Tabares at 10/4/2024 12:01 PM.  Learner: Patient  Readiness: Acceptance  Method: Explanation  Response: Verbalizes Understanding, Needs Reinforcement    Education Comments  No comments found.      Problem: ADLs  Goal: Pt will complete ADL tasks at supervision with use of AE prn   Outcome: Progressing     Problem: Functional Mobility  Goal: Pt will perform functional mobility household distances at supervision with use of RW.     Outcome: Progressing     Problem: OT Transfers  Goal: Pt will perform functional transfers at supervision.  Outcome: Progressing

## 2024-10-04 NOTE — NURSING NOTE
Assumed patient care. Patient is trying to get out of bed. She says she does not needs to use the bathroom but needs to see the tv better, reoriented patient and repositioned back in bed. Bed alarm is set belongings are within reach

## 2024-10-04 NOTE — CARE PLAN
The patient's goals for the shift include rest    The clinical goals for the shift include no falls during shift    Over the shift, the patient did not make progress toward the following goals. Barriers to progression include awaiting snf. Recommendations to address these barriers include snf.

## 2024-10-04 NOTE — PROGRESS NOTES
[facility] called pts abiodun Davis [phone] regarding the SNF list emailed to him on 10/3 and SNF choicese, VM received and message left with contact info.    Pending: Medical clearance, SNF choices and precert still needed.       10/04/24 1422   Discharge Planning   Expected Discharge Disposition SNF

## 2024-10-04 NOTE — NURSING NOTE
This nurse alerted by patient's bed alarm going off. Patient found standing at bedside. Patient verbalized that she has to have bowel movement. This nurse assisted patient to bathroom and then back to bed. Patient verbalizes no more concerns at this time. Bed alarm re-activated. Call light within reach.

## 2024-10-04 NOTE — NURSING NOTE
Pt Lying quietly in bed. Pt is confused. Spoke with patient about removing old IV and putting a new one in. Pt is agreeable for IV removal at this time. Site bandaged and remains dry. Pt denies any needs at this time. Call light within reach with instruction on how to use, pt able to demonstrate. Bed alarm engaged. Plan of care ongoing.

## 2024-10-04 NOTE — PROGRESS NOTES
Allyssa Gregg is a 79 y.o. female on day 2 of admission presenting with  a fall.     Subjective   She was sitting up in bed.  She has no acute complaints.  She has dementia.    A family member had told the patient's nose yesterday that she drinks alcohol.  The patient was oriented to self only.  When asked about alcohol, she stated that she drinks 3 to 4 cans of alcohol a day.    Objective     Physical Exam  General: awake, alert, oriented, responsive  Cardiovascular: regular, normal S1 and S2  Lungs: good air entry bilaterally, clear to auscultation  Extremities: no peripheral cyanosis, no pedal edema  Neuro: alert, oriented x 1, no focal weakness    Last Recorded Vitals  Blood pressure 154/77, pulse 83, temperature 36.6 °C (97.9 °F), temperature source Oral, resp. rate 16, height 1.524 m (5'), weight 63.1 kg (139 lb 1.8 oz), SpO2 95%.  Intake/Output last 3 Shifts:  I/O last 3 completed shifts:  In: 1956.7 (31 mL/kg) [P.O.:870; I.V.:86.7 (1.4 mL/kg); IV Piggyback:1000]  Out: 0 (0 mL/kg)   Weight: 63.1 kg     Relevant Results  Lab Results   Component Value Date    WBC 10.0 10/04/2024    HGB 14.7 10/04/2024    HCT 43.9 10/04/2024    MCV 96 10/04/2024     10/04/2024     Lab Results   Component Value Date    GLUCOSE 127 (H) 10/03/2024    CALCIUM 8.7 10/03/2024     (L) 10/03/2024    K 3.5 10/03/2024    CO2 23 10/03/2024    CL 96 (L) 10/03/2024    BUN 10 10/03/2024    CREATININE 0.77 10/03/2024     Scheduled medications  aspirin, 81 mg, oral, Daily  atorvastatin, 20 mg, oral, Nightly  donepezil, 10 mg, oral, Nightly  enoxaparin, 40 mg, subcutaneous, Daily  lisinopril, 40 mg, oral, Daily  potassium chloride, 20 mEq, oral, Daily  thiamine, 100 mg, oral, Daily      Continuous medications  potassium chloride in 0.9%NaCl, 100 mL/hr, Last Rate: 100 mL/hr (10/04/24 0352)      PRN medications  PRN medications: acetaminophen, alum-mag hydroxide-simeth, diphenhydrAMINE, melatonin, ondansetron,  ondansetron      Assessment/Plan   Assessment & Plan  Fall, initial encounter  Unwitnessed fall, unknown circumstances  PT/OT  Hyponatremia  Mild, possibly chronic. On IV normal saline.   Leukocytosis  Probably stress-induced, improving.  Dementia  On donepezil  Alcohol use  No signs of alcohol withdrawal.   Start daily thiamine.     Discharge planning  PT/OT recommended moderate intensity level of continued care    Justo Hall MD

## 2024-10-05 LAB
ANION GAP SERPL CALCULATED.3IONS-SCNC: 12 MMOL/L (ref 10–20)
BUN SERPL-MCNC: 9 MG/DL (ref 6–23)
CALCIUM SERPL-MCNC: 9 MG/DL (ref 8.6–10.3)
CHLORIDE SERPL-SCNC: 101 MMOL/L (ref 98–107)
CO2 SERPL-SCNC: 23 MMOL/L (ref 21–32)
CREAT SERPL-MCNC: 0.66 MG/DL (ref 0.5–1.05)
EGFRCR SERPLBLD CKD-EPI 2021: 89 ML/MIN/1.73M*2
ERYTHROCYTE [DISTWIDTH] IN BLOOD BY AUTOMATED COUNT: 13.4 % (ref 11.5–14.5)
GLUCOSE SERPL-MCNC: 119 MG/DL (ref 74–99)
HCT VFR BLD AUTO: 43.9 % (ref 36–46)
HGB BLD-MCNC: 15.3 G/DL (ref 12–16)
MCH RBC QN AUTO: 31.5 PG (ref 26–34)
MCHC RBC AUTO-ENTMCNC: 34.9 G/DL (ref 32–36)
MCV RBC AUTO: 90 FL (ref 80–100)
NRBC BLD-RTO: 0 /100 WBCS (ref 0–0)
PLATELET # BLD AUTO: 285 X10*3/UL (ref 150–450)
POTASSIUM SERPL-SCNC: 3.5 MMOL/L (ref 3.5–5.3)
RBC # BLD AUTO: 4.86 X10*6/UL (ref 4–5.2)
SODIUM SERPL-SCNC: 132 MMOL/L (ref 136–145)
WBC # BLD AUTO: 9.4 X10*3/UL (ref 4.4–11.3)

## 2024-10-05 PROCEDURE — 2500000001 HC RX 250 WO HCPCS SELF ADMINISTERED DRUGS (ALT 637 FOR MEDICARE OP): Performed by: INTERNAL MEDICINE

## 2024-10-05 PROCEDURE — 80048 BASIC METABOLIC PNL TOTAL CA: CPT | Performed by: INTERNAL MEDICINE

## 2024-10-05 PROCEDURE — 1100000001 HC PRIVATE ROOM DAILY

## 2024-10-05 PROCEDURE — 36415 COLL VENOUS BLD VENIPUNCTURE: CPT | Performed by: INTERNAL MEDICINE

## 2024-10-05 PROCEDURE — 2500000004 HC RX 250 GENERAL PHARMACY W/ HCPCS (ALT 636 FOR OP/ED): Performed by: INTERNAL MEDICINE

## 2024-10-05 PROCEDURE — 99232 SBSQ HOSP IP/OBS MODERATE 35: CPT | Performed by: NURSE PRACTITIONER

## 2024-10-05 PROCEDURE — 85027 COMPLETE CBC AUTOMATED: CPT | Performed by: INTERNAL MEDICINE

## 2024-10-05 RX ORDER — HYDRALAZINE HYDROCHLORIDE 20 MG/ML
10 INJECTION INTRAMUSCULAR; INTRAVENOUS EVERY 4 HOURS PRN
Status: DISPENSED | OUTPATIENT
Start: 2024-10-05

## 2024-10-05 RX ADMIN — SODIUM CHLORIDE AND POTASSIUM CHLORIDE 100 ML/HR: 9; 1.49 INJECTION, SOLUTION INTRAVENOUS at 11:18

## 2024-10-05 RX ADMIN — DONEPEZIL HYDROCHLORIDE 10 MG: 10 TABLET, FILM COATED ORAL at 20:45

## 2024-10-05 RX ADMIN — POTASSIUM CHLORIDE 20 MEQ: 1.5 FOR SOLUTION ORAL at 11:15

## 2024-10-05 RX ADMIN — SODIUM CHLORIDE AND POTASSIUM CHLORIDE 100 ML/HR: 9; 1.49 INJECTION, SOLUTION INTRAVENOUS at 22:01

## 2024-10-05 RX ADMIN — LISINOPRIL 40 MG: 40 TABLET ORAL at 11:15

## 2024-10-05 RX ADMIN — ASPIRIN 81 MG: 81 TABLET, COATED ORAL at 11:15

## 2024-10-05 RX ADMIN — ATORVASTATIN CALCIUM 20 MG: 20 TABLET, FILM COATED ORAL at 20:46

## 2024-10-05 RX ADMIN — Medication 5 MG: at 20:45

## 2024-10-05 RX ADMIN — Medication 100 MG: at 11:16

## 2024-10-05 RX ADMIN — HYDRALAZINE HYDROCHLORIDE 10 MG: 20 INJECTION INTRAMUSCULAR; INTRAVENOUS at 01:41

## 2024-10-05 ASSESSMENT — COGNITIVE AND FUNCTIONAL STATUS - GENERAL
HELP NEEDED FOR BATHING: A LITTLE
PERSONAL GROOMING: A LITTLE
MOVING TO AND FROM BED TO CHAIR: A LITTLE
EATING MEALS: A LITTLE
DAILY ACTIVITIY SCORE: 18
MOBILITY SCORE: 16
WALKING IN HOSPITAL ROOM: A LOT
CLIMB 3 TO 5 STEPS WITH RAILING: A LOT
STANDING UP FROM CHAIR USING ARMS: A LITTLE
TURNING FROM BACK TO SIDE WHILE IN FLAT BAD: A LITTLE
MOVING FROM LYING ON BACK TO SITTING ON SIDE OF FLAT BED WITH BEDRAILS: A LITTLE
DRESSING REGULAR LOWER BODY CLOTHING: A LITTLE
DRESSING REGULAR UPPER BODY CLOTHING: A LITTLE
TOILETING: A LITTLE

## 2024-10-05 ASSESSMENT — PAIN SCALES - GENERAL
PAINLEVEL_OUTOF10: 0 - NO PAIN

## 2024-10-05 ASSESSMENT — PAIN - FUNCTIONAL ASSESSMENT
PAIN_FUNCTIONAL_ASSESSMENT: 0-10

## 2024-10-05 NOTE — CARE PLAN
Went into patient's room to do afternoon vitals,  patient yelled at me to get the cuff off, saying it hurt and continued to tell me to take it off now.  LUDA Swain notified

## 2024-10-05 NOTE — PROGRESS NOTES
Allyssa Gregg is a 79 y.o. female on day 3 of admission presenting with Fall, initial encounter.      Subjective   Pt is a poor historian , sister at  bed side . She denies any chest pain or SOB. No N/V or abd pain          Objective     Last Recorded Vitals  /72 (BP Location: Right arm, Patient Position: Lying)   Pulse 88   Temp 36.8 °C (98.2 °F) (Oral)   Resp 19   Wt 66.7 kg (147 lb 0.8 oz)   SpO2 95%   Intake/Output last 3 Shifts:    Intake/Output Summary (Last 24 hours) at 10/5/2024 1735  Last data filed at 10/5/2024 1504  Gross per 24 hour   Intake 400 ml   Output --   Net 400 ml       Admission Weight  Weight: 65.3 kg (144 lb) (10/02/24 1617)    Daily Weight  10/05/24 : 66.7 kg (147 lb 0.8 oz)    Image Results  ECG 12 lead  Sinus rhythm with Fusion complexes  Possible Left atrial enlargement  Minimal voltage criteria for LVH, may be normal variant ( [place] product )  Inferior infarct , age undetermined  Anterior infarct , age undetermined  Incomplete left bundle branch block  Prolonged QT  Abnormal ECG  No previous ECGs available  Confirmed by Manjinder Dee ([id number]) on 10/3/2024 10:58:54 PM      Physical Exam    Relevant Results               Assessment/Plan                  Assessment & Plan  Fall, initial encounter  Unwitnessed fall, unknown circumstances  PT/OT  Hyponatremia  Mild, possibly chronic. On IV normal saline.   Leukocytosis  Probably stress-induced, improving.  Dementia  On donepezil  Alcohol use  No signs of alcohol withdrawal.   Start daily thiamine.                 Thais Barreto, APRN-CNP

## 2024-10-05 NOTE — NURSING NOTE
Pt refused Lovenox shot this morning after being educated to ensure orientation and informed decision. Pt still refused DVT prophylaxis. Dr. Schwartz made aware. Care ongoing.

## 2024-10-05 NOTE — NURSING NOTE
"Phlebotomy at bedside. Pt will not allow blood draw at this time. This RN spoke with patient to try and orient and explain importance of obtaining the blood work, but pt continues to refuse. Pt does state when asked if they can come back later, \"I don't know. Maybe.\" Pt denies need to use restroom or any further needs. Call light within reach. Bed alarm engaged. Plan of care ongoing.  "

## 2024-10-05 NOTE — NURSING NOTE
Assumed care of pt. Pt lying in bed, very tearful. BSSR complete. Respirations are even and unlabored on RA. Pt states they want to sleep right now. No further needs at this time. Care ongoing.

## 2024-10-05 NOTE — NURSING NOTE
Assume care of patient. BSSR complete. Pt lying in bed, daughter at bedside. Pt denies any pain or needs at this time. Call light within reach, bed alarm engaged. Plan of care ongoing.

## 2024-10-06 VITALS
DIASTOLIC BLOOD PRESSURE: 92 MMHG | HEART RATE: 82 BPM | HEIGHT: 60 IN | SYSTOLIC BLOOD PRESSURE: 179 MMHG | RESPIRATION RATE: 16 BRPM | BODY MASS INDEX: 28.87 KG/M2 | OXYGEN SATURATION: 96 % | TEMPERATURE: 98.1 F | WEIGHT: 147.05 LBS

## 2024-10-06 PROCEDURE — 1100000001 HC PRIVATE ROOM DAILY

## 2024-10-06 PROCEDURE — 2500000004 HC RX 250 GENERAL PHARMACY W/ HCPCS (ALT 636 FOR OP/ED): Performed by: INTERNAL MEDICINE

## 2024-10-06 PROCEDURE — 99232 SBSQ HOSP IP/OBS MODERATE 35: CPT | Performed by: NURSE PRACTITIONER

## 2024-10-06 PROCEDURE — 2500000001 HC RX 250 WO HCPCS SELF ADMINISTERED DRUGS (ALT 637 FOR MEDICARE OP): Performed by: INTERNAL MEDICINE

## 2024-10-06 RX ADMIN — Medication 5 MG: at 21:59

## 2024-10-06 RX ADMIN — Medication 100 MG: at 11:35

## 2024-10-06 RX ADMIN — POTASSIUM CHLORIDE 20 MEQ: 1.5 FOR SOLUTION ORAL at 11:35

## 2024-10-06 RX ADMIN — ATORVASTATIN CALCIUM 20 MG: 20 TABLET, FILM COATED ORAL at 21:59

## 2024-10-06 RX ADMIN — LISINOPRIL 40 MG: 40 TABLET ORAL at 11:35

## 2024-10-06 RX ADMIN — DONEPEZIL HYDROCHLORIDE 10 MG: 10 TABLET, FILM COATED ORAL at 21:58

## 2024-10-06 RX ADMIN — ASPIRIN 81 MG: 81 TABLET, COATED ORAL at 11:35

## 2024-10-06 RX ADMIN — HYDRALAZINE HYDROCHLORIDE 10 MG: 20 INJECTION INTRAMUSCULAR; INTRAVENOUS at 22:11

## 2024-10-06 ASSESSMENT — COGNITIVE AND FUNCTIONAL STATUS - GENERAL
WALKING IN HOSPITAL ROOM: A LITTLE
MOBILITY SCORE: 20
DRESSING REGULAR LOWER BODY CLOTHING: A LITTLE
DRESSING REGULAR UPPER BODY CLOTHING: A LITTLE
CLIMB 3 TO 5 STEPS WITH RAILING: A LITTLE
TOILETING: A LITTLE
MOBILITY SCORE: 24
MOVING FROM LYING ON BACK TO SITTING ON SIDE OF FLAT BED WITH BEDRAILS: A LITTLE
DAILY ACTIVITIY SCORE: 19
TURNING FROM BACK TO SIDE WHILE IN FLAT BAD: A LITTLE
HELP NEEDED FOR BATHING: A LITTLE
DAILY ACTIVITIY SCORE: 24
PERSONAL GROOMING: A LITTLE

## 2024-10-06 ASSESSMENT — PAIN - FUNCTIONAL ASSESSMENT
PAIN_FUNCTIONAL_ASSESSMENT: 0-10
PAIN_FUNCTIONAL_ASSESSMENT: 0-10

## 2024-10-06 ASSESSMENT — PAIN SCALES - GENERAL
PAINLEVEL_OUTOF10: 0 - NO PAIN

## 2024-10-06 ASSESSMENT — PAIN SCALES - WONG BAKER: WONGBAKER_NUMERICALRESPONSE: NO HURT

## 2024-10-06 NOTE — PROGRESS NOTES
Allyssa Gregg is a 79 y.o. female on day 4 of admission presenting with Fall, initial encounter.      Subjective   Pt is resting in her bed.  Feels fine no pain.  Tolerates well her her breakfast bed side . She denies any chest pain or SOB. No N/V or abd pain          Objective     Last Recorded Vitals  /82 (BP Location: Right arm, Patient Position: Lying)   Pulse 72   Temp 36.6 °C (97.9 °F) (Axillary)   Resp 17   Wt 66.7 kg (147 lb 0.8 oz)   SpO2 96%   Intake/Output last 3 Shifts:    Intake/Output Summary (Last 24 hours) at 10/6/2024 1512  Last data filed at 10/6/2024 1031  Gross per 24 hour   Intake 300 ml   Output --   Net 300 ml       Admission Weight  Weight: 65.3 kg (144 lb) (10/02/24 1617)    Daily Weight  10/05/24 : 66.7 kg (147 lb 0.8 oz)    Image Results  ECG 12 lead  Sinus rhythm with Fusion complexes  Possible Left atrial enlargement  Minimal voltage criteria for LVH, may be normal variant ( [place] product )  Inferior infarct , age undetermined  Anterior infarct , age undetermined  Incomplete left bundle branch block  Prolonged QT  Abnormal ECG  No previous ECGs available  Confirmed by Manjinder Dee ([id number]) on 10/3/2024 10:58:54 PM      Physical Exam  Vitals and nursing note reviewed.   Constitutional:       Appearance: Normal appearance.   HENT:      Head: Normocephalic and atraumatic.      Nose: Nose normal. No congestion or rhinorrhea.   Eyes:      Extraocular Movements: Extraocular movements intact.      Pupils: Pupils are equal, round, and reactive to light.   Cardiovascular:      Rate and Rhythm: Normal rate.   Pulmonary:      Effort: Pulmonary effort is normal.      Breath sounds: Normal breath sounds.   Abdominal:      General: Bowel sounds are normal.      Palpations: Abdomen is soft.   Musculoskeletal:         General: Normal range of motion.      Cervical back: Normal range of motion and neck supple.      Right lower leg: No edema.      Left lower leg: No edema.   Neurological:       General: No focal deficit present.      Mental Status: She is alert. Mental status is at baseline.   Psychiatric:         Mood and Affect: Mood normal.         Relevant Results               Assessment/Plan      Assessment & Plan  Fall, initial encounter  Unwitnessed fall, unknown circumstances  PT/OT  Hyponatremia  Mild, possibly chronic. On IV normal saline.   Leukocytosis  Probably stress-induced, improving.  Dementia  On donepezil  Alcohol use  No signs of alcohol withdrawal.   On  thiamine.   At  1530 called Cristino Davis left a message to call me back or call the nurse for any questions  Discharge plan nursing home    Thais Barreto, APRN-CNP

## 2024-10-06 NOTE — CARE PLAN
The patient's goals for the shift include rest    The clinical goals for the shift include safety    Over the shift, the patient did not make progress toward the following goals. Barriers to progression include hx dementia/weakness. Recommendations to address these barriers include administer medications/interventions as ordered.

## 2024-10-06 NOTE — NURSING NOTE
Pt's bed alarm sounding. Pt lying in bed. States that she has to go to the bathroom. Pt assisted to bathroom with front wheeled walker. Gait is slow and slightly unsteady. Pt then assisted back to bed. Pt denies any pain or further needs at this time. No change from previous assessment. IVF infusing with no difficulty. Call light within reach. Bed alarm engaged. Plan of care ongoing.

## 2024-10-06 NOTE — CARE PLAN
The patient's goals for the shift include rest    The clinical goals for the shift include Safety    Over the shift, the patient did not make progress toward the following goals. Barriers to progression include na. Recommendations to address these barriers include na.

## 2024-10-06 NOTE — NURSING NOTE
Assumed care of pt. BSSR complete. Pt tearful in bed this morning. Respirations even and unlabored on RA. Pt asking to get up to bathroom. Care ongoing.

## 2024-10-06 NOTE — HOSPITAL COURSE
H/O dementia , HTN,  CVA, COPD, and alcohol abuse . Pt lives by herself felt at home did not remember anything.   Admitted Fall, Dehydration.  Hyponatremia, hypokalemia, and leukocytosis. All her electrolytes were improved . She is medically clear. Per her sister her kids want her to be placed either at AL or long term facility. Waiting for precert to send her to SNF

## 2024-10-07 LAB
ALBUMIN SERPL BCP-MCNC: 3.5 G/DL (ref 3.4–5)
ALP SERPL-CCNC: 63 U/L (ref 33–136)
ALT SERPL W P-5'-P-CCNC: 29 U/L (ref 7–45)
ANION GAP SERPL CALCULATED.3IONS-SCNC: 12 MMOL/L (ref 10–20)
AST SERPL W P-5'-P-CCNC: 25 U/L (ref 9–39)
BILIRUB SERPL-MCNC: 0.5 MG/DL (ref 0–1.2)
BUN SERPL-MCNC: 11 MG/DL (ref 6–23)
CALCIUM SERPL-MCNC: 9.1 MG/DL (ref 8.6–10.3)
CHLORIDE SERPL-SCNC: 101 MMOL/L (ref 98–107)
CO2 SERPL-SCNC: 21 MMOL/L (ref 21–32)
CREAT SERPL-MCNC: 0.71 MG/DL (ref 0.5–1.05)
EGFRCR SERPLBLD CKD-EPI 2021: 86 ML/MIN/1.73M*2
ERYTHROCYTE [DISTWIDTH] IN BLOOD BY AUTOMATED COUNT: 13.5 % (ref 11.5–14.5)
GLUCOSE SERPL-MCNC: 154 MG/DL (ref 74–99)
HCT VFR BLD AUTO: 43.8 % (ref 36–46)
HGB BLD-MCNC: 15.4 G/DL (ref 12–16)
MCH RBC QN AUTO: 31.9 PG (ref 26–34)
MCHC RBC AUTO-ENTMCNC: 35.2 G/DL (ref 32–36)
MCV RBC AUTO: 91 FL (ref 80–100)
NRBC BLD-RTO: 0 /100 WBCS (ref 0–0)
PLATELET # BLD AUTO: 297 X10*3/UL (ref 150–450)
POTASSIUM SERPL-SCNC: 3.7 MMOL/L (ref 3.5–5.3)
PROT SERPL-MCNC: 6.6 G/DL (ref 6.4–8.2)
RBC # BLD AUTO: 4.83 X10*6/UL (ref 4–5.2)
SODIUM SERPL-SCNC: 130 MMOL/L (ref 136–145)
WBC # BLD AUTO: 9.7 X10*3/UL (ref 4.4–11.3)

## 2024-10-07 PROCEDURE — 82374 ASSAY BLOOD CARBON DIOXIDE: CPT | Performed by: REGISTERED NURSE

## 2024-10-07 PROCEDURE — 2500000001 HC RX 250 WO HCPCS SELF ADMINISTERED DRUGS (ALT 637 FOR MEDICARE OP): Performed by: INTERNAL MEDICINE

## 2024-10-07 PROCEDURE — 85027 COMPLETE CBC AUTOMATED: CPT | Performed by: REGISTERED NURSE

## 2024-10-07 PROCEDURE — 36415 COLL VENOUS BLD VENIPUNCTURE: CPT | Performed by: REGISTERED NURSE

## 2024-10-07 PROCEDURE — 2500000004 HC RX 250 GENERAL PHARMACY W/ HCPCS (ALT 636 FOR OP/ED): Performed by: INTERNAL MEDICINE

## 2024-10-07 PROCEDURE — 97530 THERAPEUTIC ACTIVITIES: CPT | Mod: GP | Performed by: PHYSICAL THERAPIST

## 2024-10-07 PROCEDURE — 1100000001 HC PRIVATE ROOM DAILY

## 2024-10-07 PROCEDURE — 2500000001 HC RX 250 WO HCPCS SELF ADMINISTERED DRUGS (ALT 637 FOR MEDICARE OP): Performed by: NURSE PRACTITIONER

## 2024-10-07 PROCEDURE — 99232 SBSQ HOSP IP/OBS MODERATE 35: CPT | Performed by: NURSE PRACTITIONER

## 2024-10-07 RX ORDER — AMLODIPINE BESYLATE 2.5 MG/1
2.5 TABLET ORAL DAILY
Status: DISCONTINUED | OUTPATIENT
Start: 2024-10-07 | End: 2024-10-08

## 2024-10-07 RX ADMIN — ATORVASTATIN CALCIUM 20 MG: 20 TABLET, FILM COATED ORAL at 21:44

## 2024-10-07 RX ADMIN — AMLODIPINE BESYLATE 2.5 MG: 2.5 TABLET ORAL at 13:24

## 2024-10-07 RX ADMIN — Medication 5 MG: at 21:44

## 2024-10-07 RX ADMIN — ENOXAPARIN SODIUM 40 MG: 40 INJECTION SUBCUTANEOUS at 08:42

## 2024-10-07 RX ADMIN — POTASSIUM CHLORIDE 20 MEQ: 1.5 FOR SOLUTION ORAL at 08:42

## 2024-10-07 RX ADMIN — ASPIRIN 81 MG: 81 TABLET, COATED ORAL at 08:42

## 2024-10-07 RX ADMIN — LISINOPRIL 40 MG: 40 TABLET ORAL at 08:42

## 2024-10-07 RX ADMIN — Medication 100 MG: at 08:42

## 2024-10-07 RX ADMIN — SODIUM CHLORIDE AND POTASSIUM CHLORIDE 100 ML/HR: 9; 1.49 INJECTION, SOLUTION INTRAVENOUS at 03:19

## 2024-10-07 RX ADMIN — DONEPEZIL HYDROCHLORIDE 10 MG: 10 TABLET, FILM COATED ORAL at 21:44

## 2024-10-07 ASSESSMENT — COGNITIVE AND FUNCTIONAL STATUS - GENERAL
STANDING UP FROM CHAIR USING ARMS: A LITTLE
WALKING IN HOSPITAL ROOM: A LITTLE
TURNING FROM BACK TO SIDE WHILE IN FLAT BAD: A LITTLE
MOVING FROM LYING ON BACK TO SITTING ON SIDE OF FLAT BED WITH BEDRAILS: A LITTLE
HELP NEEDED FOR BATHING: A LITTLE
PERSONAL GROOMING: A LITTLE
CLIMB 3 TO 5 STEPS WITH RAILING: A LITTLE
MOBILITY SCORE: 19
DRESSING REGULAR UPPER BODY CLOTHING: A LITTLE
WALKING IN HOSPITAL ROOM: A LITTLE
TURNING FROM BACK TO SIDE WHILE IN FLAT BAD: A LITTLE
WALKING IN HOSPITAL ROOM: A LITTLE
CLIMB 3 TO 5 STEPS WITH RAILING: A LOT
PERSONAL GROOMING: A LITTLE
MOVING FROM LYING ON BACK TO SITTING ON SIDE OF FLAT BED WITH BEDRAILS: A LITTLE
DRESSING REGULAR LOWER BODY CLOTHING: A LITTLE
MOVING TO AND FROM BED TO CHAIR: A LITTLE
MOBILITY SCORE: 20
TURNING FROM BACK TO SIDE WHILE IN FLAT BAD: A LOT
MOVING FROM LYING ON BACK TO SITTING ON SIDE OF FLAT BED WITH BEDRAILS: A LITTLE
MOVING TO AND FROM BED TO CHAIR: A LITTLE
DAILY ACTIVITIY SCORE: 19
MOBILITY SCORE: 16
DRESSING REGULAR LOWER BODY CLOTHING: A LITTLE
DAILY ACTIVITIY SCORE: 19
HELP NEEDED FOR BATHING: A LITTLE
CLIMB 3 TO 5 STEPS WITH RAILING: A LITTLE
TOILETING: A LITTLE
TOILETING: A LITTLE
DRESSING REGULAR UPPER BODY CLOTHING: A LITTLE

## 2024-10-07 ASSESSMENT — PAIN SCALES - GENERAL
PAINLEVEL_OUTOF10: 0 - NO PAIN
PAINLEVEL_OUTOF10: 0 - NO PAIN

## 2024-10-07 ASSESSMENT — PAIN - FUNCTIONAL ASSESSMENT
PAIN_FUNCTIONAL_ASSESSMENT: FLACC (FACE, LEGS, ACTIVITY, CRY, CONSOLABILITY)
PAIN_FUNCTIONAL_ASSESSMENT: 0-10
PAIN_FUNCTIONAL_ASSESSMENT: FLACC (FACE, LEGS, ACTIVITY, CRY, CONSOLABILITY)

## 2024-10-07 NOTE — NURSING NOTE
Notified Michelle Westbrook NP that pt's BP has been steadily rising and she has been on IVF with K. K is WNL. Order to discontinue IVF has been placed.

## 2024-10-07 NOTE — PROGRESS NOTES
[facility] called and reached pts son Ryan this morning to discuss the dc planning. Last week this worker advised that pt would benefit from a short stay at rehab while the family worked on a long term care plan for this pt with advanced dementia who can no longer live by herself. Ryan stating he and his brother in to see pt on 10/6 and she has improved dramatically with regards to strength and he does not feel he needs rehab at a facility anymore. [facility] did look back at the most recent PT note which shows pt is doing better. [facility] made son aware that because of this they are going to need to figure out placed to an assisted living and/or have pt go home with one of them or hire private duty aids until long term plan is figured out. Ryan has not yet called Deysi Ash with [facility] as her contact info was given to him last week, he states he has been too busy. Sammy contact info sent to him again, this worker also called Deysi and explained the situation and she will reach out to Ryan. Ryan also requesting to speak with attending NP, this was passed along. [facility] made Ryan aware that pt is medically stable and it is uncertain how long her insurance would continue to pay for her to stay and plan will need to be figured out soon.     Pending: Disharge plan, family needing to figure out safe long term plan for this pt, they would like to get her place in an assisted living. [facility] to assist with this.      10/07/24 1007   Discharge Planning   Expected Discharge Disposition Home

## 2024-10-07 NOTE — PROGRESS NOTES
Physical Therapy    Physical Therapy Treatment    Patient Name: Allyssa Gregg  MRN: [id number]  Department: [address]  Room: [address] [place] [address]  Today's Date: 10/7/2024  Time Calculation  Start Time: 1421  Stop Time: 1441  Time Calculation (min): 20 min         Assessment/Plan   PT Assessment  End of Session Communication: Bedside nurse, PCT/NA/CTA (RN cleared pt to remain in bedside chair with alarm on)  Assessment Comment: Pt made adequate progress toward h** functional mobility goals. Pt required minimally increased assist during functional mobility compared to their reported baseline. Pt would benefit from continued skilled PT services for maximizing independence and safety prior to & after discharge (MODERATE intensity).  End of Session Patient Position: Up in chair, Alarm on (call light & tray table within reach)     PT Plan  Treatment/Interventions: Bed mobility, Transfer training, Gait training, Balance training, Strengthening, Endurance training, Therapeutic exercise, Therapeutic activity  PT Plan: Ongoing PT  PT Frequency: 3 times per week  PT Discharge Recommendations: Moderate intensity level of continued care  Equipment Recommended upon Discharge: Wheeled walker  PT Recommended Transfer Status: Assist x1, Assistive device (rolling walker)  PT - OK to Discharge: Yes      General Visit Information:   PT  Visit  PT Received On: 10/07/24  General  Past Medical History Relevant to Rehab: Dementia, Hypertension, Dyslipidemia,  Vitamin D deficiency, Postmenopausal osteoporosis, CVA, COPD, History of alcohol abuse in the past  Family/Caregiver Present: No  Prior to Session Communication: Bedside nurse  Patient Position Received: Bed, 3 rail up, Alarm on  General Comment: Cleared by RN for participation. Pt was pleasantly confued and tacitly agreed to tx. She reported need to use restroom.    Subjective   Precautions:  Medical Precautions: Fall precautions    Vital Signs (Past 2hrs)        Date/Time Vitals Session Patient  Position Pulse Resp SpO2 BP MAP (mmHg)    10/07/24 1421 --  --  107  --  97 %  --  --     10/07/24 1446 --  --  91  16  97 %  136/90  96                Objective   Pain:  Pain Assessment: FLACC (Face, Legs, Activity, Cry, Consolability)  0-10 (Numeric) Pain Score: 0 - No pain    Cognition:  Overall Cognitive Status: Impaired at baseline. A&O to self; able to participate fully.     Postural Control:  Static Sitting Balance  BLE supported: CGA/close S  Static Standing Balance  BUE supported on FWW: CGA x1 </=45 sec for dependent rear hygiene.  Dynamic Standing Balance  BUE supported on FWW: Dwayne x1 for walker management.     Activity Tolerance:  Endurance: Tolerates 10 - 20 minutes of therapeutic activities with multiple rests    Treatments:  Therapeutic Activity  Dependent front hygiene while pt was seated at toilet. Pt sat on toilet </=5 minutes with close S.    Bed Mobility  Bed Mobility 1: Supine to sitting  Level of Assistance 1: Contact guard (and consistent VCs for sequencing)  Bed Mobility Comments 1: HOB elevated ~40° & used R bed rail.    Bed Mobility  2: Scooting (fwd while seated EOB)  Level of Assistance 2: Close supervision (used BUE. VCs to stop & rest in sitting or pt would have immediately performed sit>stand)    Transfers  Technique 1: Sit to stand  Transfer Device 1: Walker (FWW; EOB or L grab bar at toilet)  Transfer Level of Assistance 1: Minimum assistance (for attaining start position, including BUE placement. Minimal verbal/tactile cues for same.)  Trials/Comments 1: x1 trial EOB; x2 at toilet    Technique 2: Stand to sit  Transfer Device 2: Walker (FWW; L grab bar at toilet or bilateral armrests)  Transfer Level of Assistance 2: Minimum assistance (for safe BUE placement. Verbal cues for same & alignment with seat prior to initiation.)  Trials/Comments 2: x2 at toilet & x1 at bedside chair    Ambulation/Gait Training  Surface 1: Level tile  Device 1: Rolling walker (FWW)  Assistance 1: Minimum  assistance (for walker management. Consistent near constant verbal/tactile cues for walker management & sequencing device with bodyespecially with tight turns. Cued to keep ALEXANDER in frame of device (step closr to front of walker).)  Quality of Gait 1:  wider ALEXANDER, decreased bilateral step length, slowed velocity, continuous movement on straight paths, baseline fwd flexed posture. No threat for LOB. HR seated after final trial: 114.  Comments/Distance (ft) 1: 15 ft & 25 ft      Outcome Measures:  [facility] Basic Mobility  Turning from your back to your side while in a flat bed without using bedrails: A little  Moving from lying on your back to sitting on the side of a flat bed without using bedrails: A lot  Moving to and from bed to chair (including a wheelchair): A little  Standing up from a chair using your arms (e.g. wheelchair or bedside chair): A little  To walk in hospital room: A little  Climbing 3-5 steps with railing: A lot  Basic Mobility - Total Score: 16    Education Documentation  Mobility Training, taught by Shila Hatfield PT at 10/7/2024  3:00 PM.  Learner: Patient  Readiness: Acceptance  Method: Explanation  Response: Needs Reinforcement  Comment: Ongoing reinforcement required 2° to baseline dementia.    Education Comments  No comments found.       Encounter Problems       Encounter Problems (Active)       Mobility       STG - Patient will ambulate 100' with rolling walker and close supervision (Progressing)       Start:  10/03/24    Expected End:  10/31/24               PT Transfers       STG - Transfer from bed to chair with close supervision (Progressing)       Start:  10/03/24    Expected End:  10/31/24            STG - Patient to transfer to and from sit to supine with close supervision (Progressing)       Start:  10/03/24    Expected End:  10/31/24            STG - Patient will transfer sit to and from stand with close supervision (Progressing)       Start:  10/03/24    Expected End:  10/31/24                Pain - Adult

## 2024-10-07 NOTE — NURSING NOTE
Pt bed alarm ringing. Pt was at end of bed attempting to get up. Assisted pt to restroom with walk. Pt gait steady. Pt urinated, and assisted pt back to bed. Confirmed breakfast order is automatic. Pt in NAD. Bed alarm back on

## 2024-10-07 NOTE — NURSING NOTE
Pt refused Lovenox this morning. Pt oriented to situation and clearly explained the importance of DVT prophylaxis. Thais Barreto NP secure messaged about pts refusal. Thais recommend pt be persuaded later on.

## 2024-10-07 NOTE — NURSING NOTE
Assumed care of pt around this time.  Pt is alert, lying in bed, watching tv.  Respirations even and unlabored on room air with no s/s of distress.  IV fluids running at 100ml/hr.  Pt denies any pain or needs at the moment.  Call light and belongings within reach.  Will be continuing to monitor.

## 2024-10-07 NOTE — CARE PLAN
The patient's goals for the shift include rest    The clinical goals for the shift include safety    Over the shift, the patient did not make progress toward the following goals. Barriers to progression include history of dementia. Recommendations to address these barriers include hourly rounding, bed alarm engaged.

## 2024-10-07 NOTE — NURSING NOTE
This nurse went into pt room to hang up a new bag of fluids. Pt is on IV fluids with 20meq KCl and she did not get new lab work done to check her potassium level, therefore Epic alerted. Notified Dr. Mar of this and asked what she would like for me to do - Dr Mar said to administer the new bag of fluids because her Na and K were low and she was doubtful the pts electrolytes have fully corrected, but to monitor closely.    IV fluids given per order.  Placed pt on a heart monitor for her safety.

## 2024-10-07 NOTE — NURSING NOTE
Pt finished working with PT. Pt now sitting in bedside chair. Chair alarm engaged. Commonly used items in front of pt. Pleasantly confused. NAD.

## 2024-10-07 NOTE — PROGRESS NOTES
Allyssa Gregg is a 79 y.o. female on day 5 of admission presenting with Fall, initial encounter.    Subjective   Patient resting in bed. Denies chest pain, shortness of breath, abdominal pain. Oriented to self        Objective     Physical Exam  Constitutional:       Appearance: Normal appearance.   HENT:      Head: Normocephalic and atraumatic.      Mouth/Throat:      Mouth: Mucous membranes are moist.      Pharynx: Oropharynx is clear.   Eyes:      Extraocular Movements: Extraocular movements intact.      Conjunctiva/sclera: Conjunctivae normal.      Pupils: Pupils are equal, round, and reactive to light.   Cardiovascular:      Rate and Rhythm: Normal rate and regular rhythm.      Pulses: Normal pulses.      Heart sounds: Normal heart sounds.   Pulmonary:      Effort: Pulmonary effort is normal.      Breath sounds: Normal breath sounds.   Abdominal:      General: Bowel sounds are normal.      Palpations: Abdomen is soft.      Tenderness: There is no abdominal tenderness.   Musculoskeletal:         General: Normal range of motion.      Cervical back: Normal range of motion and neck supple.   Skin:     General: Skin is warm and dry.      Capillary Refill: Capillary refill takes less than 2 seconds.   Neurological:      Mental Status: She is alert. Mental status is at baseline. She is disoriented.   Psychiatric:         Mood and Affect: Mood normal.         Behavior: Behavior normal.         Last Recorded Vitals  Blood pressure (!) 181/97, pulse 87, temperature 36.7 °C (98.1 °F), temperature source Oral, resp. rate 16, height 1.524 m (5'), weight 66.7 kg (147 lb 0.8 oz), SpO2 98%.  Intake/Output last 3 Shifts:  I/O last 3 completed shifts:  In: 4858.3 (72.8 mL/kg) [P.O.:600; I.V.:4258.3 (63.8 mL/kg)]  Out: - (0 mL/kg)   Weight: 66.7 kg     Relevant Results  Results for orders placed or performed during the hospital encounter of 10/02/24 (from the past 24 hour(s))   CBC   Result Value Ref Range    WBC 9.7 4.4 - 11.3  x10*3/uL    nRBC 0.0 0.0 - 0.0 /100 WBCs    RBC 4.83 4.00 - 5.20 x10*6/uL    Hemoglobin 15.4 12.0 - 16.0 g/dL    Hematocrit 43.8 36.0 - 46.0 %    MCV 91 80 - 100 fL    MCH 31.9 26.0 - 34.0 pg    MCHC 35.2 32.0 - 36.0 g/dL    RDW 13.5 11.5 - 14.5 %    Platelets 297 150 - 450 x10*3/uL   Comprehensive Metabolic Panel   Result Value Ref Range    Glucose 154 (H) 74 - 99 mg/dL    Sodium 130 (L) 136 - 145 mmol/L    Potassium 3.7 3.5 - 5.3 mmol/L    Chloride 101 98 - 107 mmol/L    Bicarbonate 21 21 - 32 mmol/L    Anion Gap 12 10 - 20 mmol/L    Urea Nitrogen 11 6 - 23 mg/dL    Creatinine 0.71 0.50 - 1.05 mg/dL    eGFR 86 >60 mL/min/1.73m*2    Calcium 9.1 8.6 - 10.3 mg/dL    Albumin 3.5 3.4 - 5.0 g/dL    Alkaline Phosphatase 63 33 - 136 U/L    Total Protein 6.6 6.4 - 8.2 g/dL    AST 25 9 - 39 U/L    Bilirubin, Total 0.5 0.0 - 1.2 mg/dL    ALT 29 7 - 45 U/L       Assessment/Plan   Assessment & Plan  Fall, initial encounter  Unwitnessed fall, unknown circumstances  PT/OT  Improving   Hyponatremia  Mild, possibly chronic.  Stop IV fluids   Monitor   Leukocytosis  Probably stress-induced  Resolved   Dementia  On donepezil  Family planning for long term care.   Alcohol use  No signs of alcohol withdrawal.   On  thiamine.     DVT prophylaxis   Lovenox       1515: I called Ryan Gregg and left message on voice mail. I asked him to call main hospital number and request I be paged or call bedside nurse and request I call him back. I will try to call again around 4 pm.   1615: I spoke with Ryan Gregg. He is going to send SNF choices to [facility] to see if she will be accepted. Meanwhile, he is to tour long-term care facility this evening.     Michelle Westbrook, APRN-CNP

## 2024-10-07 NOTE — NURSING NOTE
Assumed care of pt. Pt alert X1. NAD. No signs of pain per the FLACC scale. Bed alarm on for safety. IVF infusing at 75 ml/hr. RA. BSSR completed

## 2024-10-08 LAB
ANION GAP SERPL CALCULATED.3IONS-SCNC: 14 MMOL/L (ref 10–20)
BUN SERPL-MCNC: 10 MG/DL (ref 6–23)
CALCIUM SERPL-MCNC: 9.2 MG/DL (ref 8.6–10.3)
CHLORIDE SERPL-SCNC: 99 MMOL/L (ref 98–107)
CO2 SERPL-SCNC: 21 MMOL/L (ref 21–32)
CREAT SERPL-MCNC: 0.64 MG/DL (ref 0.5–1.05)
EGFRCR SERPLBLD CKD-EPI 2021: 89 ML/MIN/1.73M*2
ERYTHROCYTE [DISTWIDTH] IN BLOOD BY AUTOMATED COUNT: 13.9 % (ref 11.5–14.5)
GLUCOSE SERPL-MCNC: 113 MG/DL (ref 74–99)
HCT VFR BLD AUTO: 47.6 % (ref 36–46)
HGB BLD-MCNC: 16.2 G/DL (ref 12–16)
MCH RBC QN AUTO: 32 PG (ref 26–34)
MCHC RBC AUTO-ENTMCNC: 34 G/DL (ref 32–36)
MCV RBC AUTO: 94 FL (ref 80–100)
NRBC BLD-RTO: 0 /100 WBCS (ref 0–0)
PLATELET # BLD AUTO: 264 X10*3/UL (ref 150–450)
POTASSIUM SERPL-SCNC: 3.7 MMOL/L (ref 3.5–5.3)
RBC # BLD AUTO: 5.07 X10*6/UL (ref 4–5.2)
SODIUM SERPL-SCNC: 130 MMOL/L (ref 136–145)
WBC # BLD AUTO: 9.9 X10*3/UL (ref 4.4–11.3)

## 2024-10-08 PROCEDURE — 2500000001 HC RX 250 WO HCPCS SELF ADMINISTERED DRUGS (ALT 637 FOR MEDICARE OP): Performed by: INTERNAL MEDICINE

## 2024-10-08 PROCEDURE — 36415 COLL VENOUS BLD VENIPUNCTURE: CPT | Performed by: NURSE PRACTITIONER

## 2024-10-08 PROCEDURE — 82374 ASSAY BLOOD CARBON DIOXIDE: CPT | Performed by: NURSE PRACTITIONER

## 2024-10-08 PROCEDURE — 2500000001 HC RX 250 WO HCPCS SELF ADMINISTERED DRUGS (ALT 637 FOR MEDICARE OP): Performed by: NURSE PRACTITIONER

## 2024-10-08 PROCEDURE — 99232 SBSQ HOSP IP/OBS MODERATE 35: CPT | Performed by: NURSE PRACTITIONER

## 2024-10-08 PROCEDURE — 1100000001 HC PRIVATE ROOM DAILY

## 2024-10-08 PROCEDURE — 85027 COMPLETE CBC AUTOMATED: CPT | Performed by: REGISTERED NURSE

## 2024-10-08 RX ORDER — AMLODIPINE BESYLATE 5 MG/1
5 TABLET ORAL DAILY
Status: DISCONTINUED | OUTPATIENT
Start: 2024-10-08 | End: 2024-10-10 | Stop reason: HOSPADM

## 2024-10-08 RX ADMIN — LISINOPRIL 40 MG: 40 TABLET ORAL at 09:11

## 2024-10-08 RX ADMIN — Medication 5 MG: at 20:27

## 2024-10-08 RX ADMIN — ASPIRIN 81 MG: 81 TABLET, COATED ORAL at 09:11

## 2024-10-08 RX ADMIN — POTASSIUM CHLORIDE 20 MEQ: 1.5 FOR SOLUTION ORAL at 09:11

## 2024-10-08 RX ADMIN — DONEPEZIL HYDROCHLORIDE 10 MG: 10 TABLET, FILM COATED ORAL at 20:27

## 2024-10-08 RX ADMIN — AMLODIPINE BESYLATE 5 MG: 5 TABLET ORAL at 09:11

## 2024-10-08 RX ADMIN — Medication 100 MG: at 09:11

## 2024-10-08 RX ADMIN — ATORVASTATIN CALCIUM 20 MG: 20 TABLET, FILM COATED ORAL at 20:27

## 2024-10-08 ASSESSMENT — PAIN SCALES - GENERAL
PAINLEVEL_OUTOF10: 0 - NO PAIN
PAINLEVEL_OUTOF10: 0 - NO PAIN

## 2024-10-08 ASSESSMENT — COGNITIVE AND FUNCTIONAL STATUS - GENERAL
MOVING TO AND FROM BED TO CHAIR: A LITTLE
TURNING FROM BACK TO SIDE WHILE IN FLAT BAD: A LITTLE
DAILY ACTIVITIY SCORE: 20
DRESSING REGULAR UPPER BODY CLOTHING: A LITTLE
MOBILITY SCORE: 20
CLIMB 3 TO 5 STEPS WITH RAILING: A LITTLE
TOILETING: A LITTLE
DRESSING REGULAR LOWER BODY CLOTHING: A LITTLE
MOVING FROM LYING ON BACK TO SITTING ON SIDE OF FLAT BED WITH BEDRAILS: A LITTLE
HELP NEEDED FOR BATHING: A LITTLE

## 2024-10-08 ASSESSMENT — PAIN - FUNCTIONAL ASSESSMENT: PAIN_FUNCTIONAL_ASSESSMENT: 0-10

## 2024-10-08 NOTE — PROGRESS NOTES
Pts son spoke with attending CNP, now wants to try and get pt into SNF for rehab while he works with Deysi Ash from [facility] on long term plan. Choices provided of [facility], [facility] and [facility], referrals sent via [facility], await responses. Precert needed.      10/08/24 0802   Discharge Planning   Expected Discharge Disposition SNF

## 2024-10-08 NOTE — PROGRESS NOTES
Allyssa Gregg is a 79 y.o. female on day 6 of admission presenting with Fall, initial encounter.    Subjective   Patient resting in bed. Denies chest pain, shortness of breath, abdominal pain. Oriented to self only.        Objective     Physical Exam  Constitutional:       Appearance: Normal appearance.   HENT:      Head: Normocephalic and atraumatic.      Mouth/Throat:      Mouth: Mucous membranes are moist.      Pharynx: Oropharynx is clear.   Eyes:      Extraocular Movements: Extraocular movements intact.      Conjunctiva/sclera: Conjunctivae normal.      Pupils: Pupils are equal, round, and reactive to light.   Cardiovascular:      Rate and Rhythm: Normal rate and regular rhythm.      Pulses: Normal pulses.      Heart sounds: Normal heart sounds.   Pulmonary:      Effort: Pulmonary effort is normal.      Breath sounds: Normal breath sounds.   Abdominal:      General: Bowel sounds are normal.      Palpations: Abdomen is soft.      Tenderness: There is no abdominal tenderness.   Musculoskeletal:         General: Normal range of motion.      Cervical back: Normal range of motion and neck supple.   Skin:     General: Skin is warm and dry.      Capillary Refill: Capillary refill takes less than 2 seconds.   Neurological:      General: No focal deficit present.      Mental Status: She is alert and oriented to person, place, and time.   Psychiatric:         Mood and Affect: Mood normal.         Behavior: Behavior normal.         Last Recorded Vitals  Blood pressure (!) 160/91, pulse 81, temperature 36.3 °C (97.3 °F), temperature source Axillary, resp. rate 16, height 1.524 m (5'), weight 66.7 kg (147 lb 0.8 oz), SpO2 98%.  Intake/Output last 3 Shifts:  I/O last 3 completed shifts:  In: 4618.3 (69.2 mL/kg) [P.O.:360; I.V.:4258.3 (63.8 mL/kg)]  Out: - (0 mL/kg)   Weight: 66.7 kg     Relevant Results  Results for orders placed or performed during the hospital encounter of 10/02/24 (from the past 24 hour(s))   Basic Metabolic  Panel   Result Value Ref Range    Glucose 113 (H) 74 - 99 mg/dL    Sodium 130 (L) 136 - 145 mmol/L    Potassium 3.7 3.5 - 5.3 mmol/L    Chloride 99 98 - 107 mmol/L    Bicarbonate 21 21 - 32 mmol/L    Anion Gap 14 10 - 20 mmol/L    Urea Nitrogen 10 6 - 23 mg/dL    Creatinine 0.64 0.50 - 1.05 mg/dL    eGFR 89 >60 mL/min/1.73m*2    Calcium 9.2 8.6 - 10.3 mg/dL   CBC   Result Value Ref Range    WBC 9.9 4.4 - 11.3 x10*3/uL    nRBC 0.0 0.0 - 0.0 /100 WBCs    RBC 5.07 4.00 - 5.20 x10*6/uL    Hemoglobin 16.2 (H) 12.0 - 16.0 g/dL    Hematocrit 47.6 (H) 36.0 - 46.0 %    MCV 94 80 - 100 fL    MCH 32.0 26.0 - 34.0 pg    MCHC 34.0 32.0 - 36.0 g/dL    RDW 13.9 11.5 - 14.5 %    Platelets 264 150 - 450 x10*3/uL         Assessment/Plan   Assessment & Plan  Fall, initial encounter  Unwitnessed fall, unknown circumstances  PT/OT  Improving   Plan for SNF at discharge. Await precert   Hyponatremia  Mild, possibly chronic.  Stable   Monitor   Leukocytosis  Probably stress-induced  Resolved   Dementia  On donepezil  Family planning for long term care.   Alcohol use  No signs of alcohol withdrawal.   On thiamine.   Maintain abstinence     DVT prophylaxis   Lovenox     Michelle Westbrook, APRN-CNP

## 2024-10-08 NOTE — NURSING NOTE
Assume care of patient. BSSR complete. Pt sitting up in bedside chair watching tv. Pt denies any needs or pain at this time. Denies wanting to get back in bed or need to use restroom. Call light within reach. Chair alarm engaged. Plan of care ongoing.

## 2024-10-08 NOTE — NURSING NOTE
Pt chair alarm sounding. Pt found sitting in chair. Pt states she would like to go to bed but wants to continue to watch tv. Pt assisted back into bed and positioned for comfort. TV within line of site. Pt denies any further needs at this time. Call light within reach. Bed alarm engaged. Plan of care ongoing.

## 2024-10-08 NOTE — NURSING NOTE
Assumed care of pt around this time.  Pt is alert, lying in bed, watching tv.  Respirations even and unlabored on room air with no s/s of distress.  No IV fluids running.  Pt denies any pain or needs at the moment.  Call light and belongings within reach.  Will be continuing to monitor.

## 2024-10-08 NOTE — PROGRESS NOTES
[facility] and [facility] cannot accept, [facility] has accepted, request for start of precert sent to direct submit team.     Update: [facility] now stating that they cannot accept, do not feel equipped to deal with pts dementia symptoms and concerned as they are [facility] and [facility] not have availability. Pts son did provide additional choices of [facility], [facility] and [facility], referrals sent to facilities via [facility], await responses. Precert still needed.     Precert pending.     10/08/24 2663   Discharge Planning   Expected Discharge Disposition SNF  ([facility])

## 2024-10-08 NOTE — NURSING NOTE
Assumed care of pt. Pt in bed watching TV, NAD. Alert X1. No signs of pain per the FLACC scale. RA.  Bed alarm on for safety. BSSR completed

## 2024-10-08 NOTE — PROGRESS NOTES
Nutrition Follow up Note    Nutrition Assessment      Pt is alert and oriented to self only. Pt w/ Poor PO intake, will provide Ensure compact TID. Plan for SNF, awaiting pre-cert.     Nutrition History:  Food and Nutrient History: charts show declining PO intake  Energy Intake: Poor < 50 %  Food Allergies/Intolerances:  None    Anthropometrics:  Ht: 152.4 cm (5'), Wt: 66.7 kg (147 lb 0.8 oz), BMI: 28.72             Weight Change:  Daily Weight  10/05/24 : 66.7 kg (147 lb 0.8 oz)  04/25/24 : 65.3 kg (144 lb)  10/26/23 : 63.5 kg (140 lb)  08/18/22 : 64 kg (141 lb)  02/08/22 : 66.2 kg (146 lb)  04/08/21 : 68.9 kg (152 lb)  10/08/20 : 63.3 kg (139 lb 9.6 oz)     Weight History / % Weight Change: records show stable weight for 4 years             Nutrition Focused Physical Exam Findings:                       Nutrition Significant Labs:  Lab Results   Component Value Date    WBC 9.9 10/08/2024    HGB 16.2 (H) 10/08/2024    HCT 47.6 (H) 10/08/2024     10/08/2024    CHOL 151 10/26/2023    TRIG 97 10/26/2023    HDL 71.0 10/26/2023    ALT 29 10/07/2024    AST 25 10/07/2024     (L) 10/08/2024    K 3.7 10/08/2024    CL 99 10/08/2024    CREATININE 0.64 10/08/2024    BUN 10 10/08/2024    CO2 21 10/08/2024    TSH 1.41 10/03/2024    INR 1.0 04/11/2018     Nutrition Specific Medications:  amLODIPine, 5 mg, oral, Daily  aspirin, 81 mg, oral, Daily  atorvastatin, 20 mg, oral, Nightly  donepezil, 10 mg, oral, Nightly  enoxaparin, 40 mg, subcutaneous, Daily  lisinopril, 40 mg, oral, Daily  potassium chloride, 20 mEq, oral, Daily  thiamine, 100 mg, oral, Daily        Dietary Orders (From admission, onward)       Start     Ordered    10/02/24 2135  Adult diet Regular  Diet effective now        Question:  Diet type  Answer:  Regular    10/02/24 3313                  Estimated Needs:   Estimated Energy Needs  Total Energy Estimated Needs (kCal): 1578 kCal  Total Estimated Energy Need per Day (kCal/kg): 25 kCal/kg  Method for  Estimating Needs: actual wt    Estimated Protein Needs  Total Protein Estimated Needs (g): 63 g  Total Protein Estimated Needs (g/kg): 1 g/kg  Method for Estimating Needs: actual wt    Estimated Fluid Needs  Total Fluid Estimated Needs (mL): 1578 mL  Method for Estimating Needs: 1 mL/kcal        Nutrition Diagnosis   Nutrition Diagnosis:       Nutrition Diagnosis  Patient has Nutrition Diagnosis: Yes  Diagnosis Status (1): New  Nutrition Diagnosis 1: Inadequate energy intake  Related to (1): decreased ability to cosume sufficient energy  As Evidenced by (1): poor PO intake       Nutrition Interventions/Recommendations   Nutrition Interventions and Recommendations:    Nutrition Prescription:  Individualized Nutrition Prescription Provided for : 1578 kcals, 63 g protein via diet    Nutrition Interventions:   Food and/or Nutrient Delivery Interventions  Interventions: Meals and snacks, Medical food supplement  Meals and Snacks: General healthful diet  Goal: provide diet as ordered  Medical Food Supplement: Commercial beverage  Goal: ensure compact TID to provide 220 kcals and 9g protein each    Education Documentation  No documentation found.           Nutrition Monitoring and Evaluation   Monitoring/Evaluation:   Food/Nutrient Related History Monitoring  Monitoring and Evaluation Plan: Energy intake  Energy Intake: Estimated energy intake  Criteria: pt to consume >/= 75% estimated needs         Time Spent/Follow-up:   Follow Up  Time Spent (min): 25 minutes  Last Date of Nutrition Visit: 10/08/24  Nutrition Follow-Up Needed?: 5-7 days  Follow up Comment: 10/14/24

## 2024-10-08 NOTE — CARE PLAN
The patient's goals for the shift include rest    The clinical goals for the shift include Safety, stable VS    Over the shift, the patient did make progress toward the following goals.       Problem: Fall/Injury  Goal: Pace activities to prevent fatigue by end of the shift  Outcome: Progressing     Problem: Skin  Goal: Prevent/minimize sheer/friction injuries  Outcome: Progressing     Problem: Skin  Goal: Promote/optimize nutrition  Outcome: Progressing     Problem: Skin  Goal: Promote skin healing  Outcome: Progressing     Problem: ADLs  Goal: Pt will complete ADL tasks at supervision with use of AE prn   Outcome: Progressing     Problem: Pain - Adult  Goal: Verbalizes/displays adequate comfort level or baseline comfort level  Outcome: Progressing     Problem: Safety - Adult  Goal: Free from fall injury  Outcome: Progressing     Problem: Discharge Planning  Goal: Discharge to home or other facility with appropriate resources  Outcome: Progressing     Problem: Chronic Conditions and Co-morbidities  Goal: Patient's chronic conditions and co-morbidity symptoms are monitored and maintained or improved  Outcome: Progressing

## 2024-10-08 NOTE — CARE PLAN
The patient's goals for the shift include rest    The clinical goals for the shift include safety, BP WNL    Over the shift, the patient did not make progress toward the following goals. Barriers to progression include n/a. Recommendations to address these barriers include n/a.

## 2024-10-08 NOTE — ASSESSMENT & PLAN NOTE
Unwitnessed fall, unknown circumstances  PT/OT  Improving   Plan for SNF at discharge. Await precert

## 2024-10-09 LAB
ANION GAP SERPL CALCULATED.3IONS-SCNC: 14 MMOL/L (ref 10–20)
BUN SERPL-MCNC: 14 MG/DL (ref 6–23)
CALCIUM SERPL-MCNC: 9.3 MG/DL (ref 8.6–10.3)
CHLORIDE SERPL-SCNC: 98 MMOL/L (ref 98–107)
CO2 SERPL-SCNC: 22 MMOL/L (ref 21–32)
CREAT SERPL-MCNC: 0.67 MG/DL (ref 0.5–1.05)
EGFRCR SERPLBLD CKD-EPI 2021: 88 ML/MIN/1.73M*2
ERYTHROCYTE [DISTWIDTH] IN BLOOD BY AUTOMATED COUNT: 13.7 % (ref 11.5–14.5)
GLUCOSE SERPL-MCNC: 108 MG/DL (ref 74–99)
HCT VFR BLD AUTO: 48.4 % (ref 36–46)
HGB BLD-MCNC: 16.8 G/DL (ref 12–16)
MCH RBC QN AUTO: 32.6 PG (ref 26–34)
MCHC RBC AUTO-ENTMCNC: 34.7 G/DL (ref 32–36)
MCV RBC AUTO: 94 FL (ref 80–100)
NRBC BLD-RTO: 0 /100 WBCS (ref 0–0)
PLATELET # BLD AUTO: 230 X10*3/UL (ref 150–450)
POTASSIUM SERPL-SCNC: 3.7 MMOL/L (ref 3.5–5.3)
RBC # BLD AUTO: 5.16 X10*6/UL (ref 4–5.2)
SODIUM SERPL-SCNC: 130 MMOL/L (ref 136–145)
WBC # BLD AUTO: 8.7 X10*3/UL (ref 4.4–11.3)

## 2024-10-09 PROCEDURE — 36415 COLL VENOUS BLD VENIPUNCTURE: CPT | Performed by: NURSE PRACTITIONER

## 2024-10-09 PROCEDURE — 2500000004 HC RX 250 GENERAL PHARMACY W/ HCPCS (ALT 636 FOR OP/ED): Performed by: INTERNAL MEDICINE

## 2024-10-09 PROCEDURE — 2500000001 HC RX 250 WO HCPCS SELF ADMINISTERED DRUGS (ALT 637 FOR MEDICARE OP): Performed by: INTERNAL MEDICINE

## 2024-10-09 PROCEDURE — 2500000001 HC RX 250 WO HCPCS SELF ADMINISTERED DRUGS (ALT 637 FOR MEDICARE OP): Performed by: NURSE PRACTITIONER

## 2024-10-09 PROCEDURE — 97530 THERAPEUTIC ACTIVITIES: CPT | Mod: GO,CO

## 2024-10-09 PROCEDURE — 97116 GAIT TRAINING THERAPY: CPT | Mod: GP,CQ

## 2024-10-09 PROCEDURE — 1100000001 HC PRIVATE ROOM DAILY

## 2024-10-09 PROCEDURE — 85027 COMPLETE CBC AUTOMATED: CPT | Performed by: REGISTERED NURSE

## 2024-10-09 PROCEDURE — 80048 BASIC METABOLIC PNL TOTAL CA: CPT | Performed by: NURSE PRACTITIONER

## 2024-10-09 PROCEDURE — 97535 SELF CARE MNGMENT TRAINING: CPT | Mod: GO,CO

## 2024-10-09 PROCEDURE — 99232 SBSQ HOSP IP/OBS MODERATE 35: CPT | Performed by: NURSE PRACTITIONER

## 2024-10-09 RX ADMIN — ASPIRIN 81 MG: 81 TABLET, COATED ORAL at 11:16

## 2024-10-09 RX ADMIN — ENOXAPARIN SODIUM 40 MG: 40 INJECTION SUBCUTANEOUS at 10:25

## 2024-10-09 RX ADMIN — LISINOPRIL 40 MG: 40 TABLET ORAL at 11:15

## 2024-10-09 RX ADMIN — ATORVASTATIN CALCIUM 20 MG: 20 TABLET, FILM COATED ORAL at 21:43

## 2024-10-09 RX ADMIN — AMLODIPINE BESYLATE 5 MG: 5 TABLET ORAL at 11:15

## 2024-10-09 RX ADMIN — POTASSIUM CHLORIDE 20 MEQ: 1.5 FOR SOLUTION ORAL at 11:16

## 2024-10-09 RX ADMIN — Medication 5 MG: at 21:43

## 2024-10-09 RX ADMIN — Medication 100 MG: at 11:16

## 2024-10-09 RX ADMIN — DONEPEZIL HYDROCHLORIDE 10 MG: 10 TABLET, FILM COATED ORAL at 21:43

## 2024-10-09 ASSESSMENT — COGNITIVE AND FUNCTIONAL STATUS - GENERAL
MOBILITY SCORE: 16
HELP NEEDED FOR BATHING: A LITTLE
TURNING FROM BACK TO SIDE WHILE IN FLAT BAD: A LITTLE
DAILY ACTIVITIY SCORE: 18
WALKING IN HOSPITAL ROOM: A LOT
STANDING UP FROM CHAIR USING ARMS: A LITTLE
DRESSING REGULAR UPPER BODY CLOTHING: A LITTLE
MOBILITY SCORE: 17
DAILY ACTIVITIY SCORE: 18
TOILETING: A LITTLE
HELP NEEDED FOR BATHING: A LOT
EATING MEALS: A LITTLE
DRESSING REGULAR UPPER BODY CLOTHING: A LITTLE
MOVING FROM LYING ON BACK TO SITTING ON SIDE OF FLAT BED WITH BEDRAILS: A LITTLE
CLIMB 3 TO 5 STEPS WITH RAILING: A LOT
CLIMB 3 TO 5 STEPS WITH RAILING: A LOT
EATING MEALS: A LITTLE
HELP NEEDED FOR BATHING: A LITTLE
DRESSING REGULAR LOWER BODY CLOTHING: A LITTLE
DRESSING REGULAR LOWER BODY CLOTHING: A LITTLE
MOVING TO AND FROM BED TO CHAIR: A LITTLE
MOBILITY SCORE: 16
PERSONAL GROOMING: A LITTLE
EATING MEALS: A LITTLE
TURNING FROM BACK TO SIDE WHILE IN FLAT BAD: A LITTLE
TOILETING: A LITTLE
WALKING IN HOSPITAL ROOM: A LITTLE
STANDING UP FROM CHAIR USING ARMS: A LITTLE
MOVING FROM LYING ON BACK TO SITTING ON SIDE OF FLAT BED WITH BEDRAILS: A LITTLE
DAILY ACTIVITIY SCORE: 17
CLIMB 3 TO 5 STEPS WITH RAILING: A LOT
MOVING TO AND FROM BED TO CHAIR: A LITTLE
PERSONAL GROOMING: A LITTLE
TOILETING: A LITTLE
MOVING FROM LYING ON BACK TO SITTING ON SIDE OF FLAT BED WITH BEDRAILS: A LITTLE
PERSONAL GROOMING: A LITTLE
TURNING FROM BACK TO SIDE WHILE IN FLAT BAD: A LITTLE
STANDING UP FROM CHAIR USING ARMS: A LITTLE
DRESSING REGULAR UPPER BODY CLOTHING: A LITTLE
WALKING IN HOSPITAL ROOM: A LOT
MOVING TO AND FROM BED TO CHAIR: A LITTLE
DRESSING REGULAR LOWER BODY CLOTHING: A LITTLE

## 2024-10-09 ASSESSMENT — PAIN - FUNCTIONAL ASSESSMENT
PAIN_FUNCTIONAL_ASSESSMENT: 0-10
PAIN_FUNCTIONAL_ASSESSMENT: 0-10
PAIN_FUNCTIONAL_ASSESSMENT: FLACC (FACE, LEGS, ACTIVITY, CRY, CONSOLABILITY)
PAIN_FUNCTIONAL_ASSESSMENT: 0-10
PAIN_FUNCTIONAL_ASSESSMENT: 0-10

## 2024-10-09 ASSESSMENT — ACTIVITIES OF DAILY LIVING (ADL)
BATHING_WHERE_ASSESSED: SITTING SINKSIDE;STANDING SINKSIDE
HOME_MANAGEMENT_TIME_ENTRY: 32
BATHING_LEVEL_OF_ASSISTANCE: MINIMUM ASSISTANCE

## 2024-10-09 ASSESSMENT — PAIN SCALES - GENERAL
PAINLEVEL_OUTOF10: 0 - NO PAIN

## 2024-10-09 ASSESSMENT — PAIN SCALES - WONG BAKER: WONGBAKER_NUMERICALRESPONSE: NO HURT

## 2024-10-09 NOTE — ASSESSMENT & PLAN NOTE
Unwitnessed fall, unknown circumstances  PT/OT  Improving   Plan for SNF at discharge. Await accepting facility   Family planning for long-term care once rehab complete

## 2024-10-09 NOTE — PROGRESS NOTES
[facility] is reviewing pts information - [facility] sent them a message requesting update on whether they might be able to accept or not, this worker also needs other facility choices from the pts son as the rest of SNF choices have declined to accept for one reason or another. Precert still needed as well. Son also working with Deysi Ash with [facility] to find a suitable long term arrangement.     Update: [facility] with discussion with pts son Ryan this morning, he expresses confusion and some frustration regarding all of the people involved in the pts care and getting confused. [facility] explained the role of the attending, this workers role and the role of Deysi Ash from [facility]. [facility] advised that an update had not been provided to him regarding accepting facility as non of the facilities he chose accepted and the one that was reviewing just got back stating they could accept if the pt does not require memory care, son wants pt to go to memory care unit. [facility] advised of SNF's nearby who do have memory care units nearby however making sure he is aware that these are being mentioned only due to them having memory care not as recommendation as all get mixed reviews. Son ok with referrals to [facility], [facility] and [facility] via [facility], await responses. Precert also still needed.      10/09/24 0942   Discharge Planning   Expected Discharge Disposition SNF

## 2024-10-09 NOTE — CARE PLAN
The patient's goals for the shift include rest    The clinical goals for the shift include remain hemodynamically stable/safety    Over the shift, the patient did not make progress toward the following goals. Barriers to progression include na. Recommendations to address these barriers include na.

## 2024-10-09 NOTE — CARE PLAN
The patient's goals for the shift include rest    The clinical goals for the shift include remain hemodynamically stable/safety    Over the shift, the patient did not make progress toward the following goals. Barriers to progression include high blood pressures/dementia. Recommendations to address these barriers include administer medications/interventions as ordered.

## 2024-10-09 NOTE — NURSING NOTE
Pt lying quietly in bed watching tv. Pt reoriented to where she is, shows obvious sign of relief. Pt denies any pain. Denies need to use the restroom at this time. No change from previous assessment. Call light within reach, bed alarm engaged. Plan of care ongoing.

## 2024-10-09 NOTE — NURSING NOTE
Pt lying quietly in bed with eyes closed. Even unlabored breaths noted. No signs of distress. No needs identified at this time. No change from previous assessment. Call light within reach, bed alarm engaged. Plan of care ongoing.

## 2024-10-09 NOTE — NURSING NOTE
Assumed care of pt. BSSR complete. Pt resting quietly in bed this morning. Respirations even and unlabored on RA. Pt awaiting pre cert for long term care. Care ongoing.

## 2024-10-09 NOTE — PROGRESS NOTES
[facility] stating they should be able to accept however they are needing the pts correct social security number. The social on file appears to be the beginning numbers of pts sons phone number. [facility] called son Ryan and asked for correct social, he is to do this and call this worker back. Precert still needing to be started.     Update: Social security number received and sent to [facility] who state precert can be started. Request for start of precert sent to direct submit team.    Precert pending.     10/09/24 7753   Discharge Planning   Expected Discharge Disposition SNF  ([facility])

## 2024-10-09 NOTE — PROGRESS NOTES
Occupational Therapy    OT Treatment    Patient Name: Allyssa Gregg  MRN: [id number]  Department: [address]  Room: [address] [place] [address]  Today's Date: 10/9/2024  Time Calculation  Start Time: 1429  Stop Time: 1511  Time Calculation (min): 42 min        Assessment:  OT Assessment: Pt progressing with POC.  Will continue to address remaining deficits with skilled OT intervention.  Prognosis: Fair  Barriers to Discharge: None  Evaluation/Treatment Tolerance: Patient tolerated treatment well  Medical Staff Made Aware: Yes  End of Session Communication: Bedside nurse  End of Session Patient Position: Up in chair, Alarm on (call light in reach Pt demonstrateuse all needs met.)  OT Assessment Results: Decreased ADL status, Decreased upper extremity strength, Decreased safe judgment during ADL, Decreased cognition, Decreased endurance, Decreased functional mobility  Prognosis: Fair  Barriers to Discharge: None  Evaluation/Treatment Tolerance: Patient tolerated treatment well  Medical Staff Made Aware: Yes  Strengths: Premorbid level of function  Barriers to Participation: Comorbidities, Ability to acquire knowledge  Plan:  Treatment Interventions: ADL retraining, Functional transfer training, Endurance training, Patient/family training, Equipment evaluation/education, Compensatory technique education  OT Frequency: 3 times per week  OT Discharge Recommendations: Moderate intensity level of continued care  Equipment Recommended upon Discharge: Wheeled walker  OT Recommended Transfer Status: Assist of 1  OT - OK to Discharge: Yes  Treatment Interventions: ADL retraining, Functional transfer training, Endurance training, Patient/family training, Equipment evaluation/education, Compensatory technique education    Subjective   Previous Visit Info:  OT Last Visit  OT Received On: 10/09/24  General:  General  Reason for Referral: Impaired Mobility  Referred By: Cayla Olivares MD  Past Medical History Relevant to Rehab: Dementia, Hypertension,  Dyslipidemia,  Vitamin D deficiency, Postmenopausal osteoporosis, CVA, COPD, History of alcohol abuse in the past  Prior to Session Communication: Bedside nurse  Patient Position Received: Bed, 3 rail up, Alarm on  Preferred Learning Style: auditory, visual, verbal  General Comment: Pt cleared by NSG and agreeable to skilled OT intervention supine upon entrance to room.  Precautions:  Medical Precautions: Fall precautions    Vital Signs (Past 2hrs)           Pain:  Pain Assessment  Pain Assessment: 0-10  0-10 (Numeric) Pain Score: 0 - No pain    Objective    Cognition:  Cognition  Overall Cognitive Status: Impaired at baseline  Orientation Level: Disoriented to place, Disoriented to time, Disoriented to situation  Following Commands: Follows one step commands with repetition  Cognition Comments: inappropriate laughter throughout tx  Insight: Severe  Impulsive: Moderately  Processing Speed: Delayed  Coordination:  Coordination Comment: decreased rate and accuracy of movement  Activities of Daily Living:      Grooming  Grooming Level of Assistance: Close supervision  Grooming Where Assessed: Chair  Grooming Comments: Pt in stance washing hands, comb hair seated brush teeth vc advance task/sequence    UE Bathing  UE Bathing Level of Assistance: Close supervision  UE Bathing Where Assessed: Sitting sinkside  UE Bathing Comments: Pt sponge bathing cues advance task    LE Bathing  LE Bathing Level of Assistance: Minimum assistance  LE Bathing Where Assessed: Sitting sinkside, Standing sinkside  LE Bathing Comments: assist wash B feet, Pt sponge bathing vc sequencing    UE Dressing  UE Dressing Level of Assistance: Minimum assistance  UE Dressing Where Assessed: Other (Comment) (toilet)  UE Dressing Comments: Pt doff/don hospital gown assist fasten/sequence    LE Dressing  LE Dressing: Yes  Pants Level of Assistance: Minimum assistance (Pt doff pants/underwear  seated assist thread RLE to don Pt pull over hips in  stance)  Sock Level of Assistance: Close supervision (P doff/don)  LE Dressing Where Assessed: Toilet  LE Dressing Comments: instructed Pt with adeaptive techniques and sequencing to advance task    Toileting  Toileting Level of Assistance: Close supervision  Where Assessed: Toilet  Toileting Comments: Pt adjusting [id number] prior/after /hygiene vc advance task  Functional Standing Tolerance:  Time: 4 minutes  Activity: ADL/transfers  Functional Standing Tolerance Comments: F balance  Bed Mobility/Transfers: Bed Mobility  Bed Mobility: Yes  Bed Mobility 1  Bed Mobility 1: Supine to sitting  Level of Assistance 1: Close supervision  Bed Mobility Comments 1: bed to neutral  Bed Mobility 2  Bed Mobility  2: Scooting  Level of Assistance 2: Close supervision  Bed Mobility Comments 2: seated to edge of bed    Transfers  Transfer: Yes  Transfer 1  Transfer From 1: Bed to  Transfer to 1: Stand  Technique 1: Sit to stand, Stand to sit  Transfer Device 1: Walker  Transfer Level of Assistance 1: Contact guard  Trials/Comments 1: vc hand placement  Transfers 2  Transfer From 2: Stand to  Transfer to 2: Chair with arms  Technique 2: Stand to sit  Transfer Device 2: Walker  Transfer Level of Assistance 2: Contact guard  Trials/Comments 2: vc hand placement    Toilet Transfers  Toilet Transfer From: Rolling walker  Toilet Transfer Type: To and from  Toilet Transfer to: Standard toilet  Toilet Transfer Technique: Ambulating  Toilet Transfers: Contact guard  Toilet Transfers Comments: grab bar use vc hand placement     Functional Mobility:  Functional Mobility  Functional Mobility Performed: Yes  Functional Mobility 1  Surface 1: Level tile  Device 1: Rolling walker  Assistance 1: Contact guard  Comments 1: 15' x 2 to include doorway, turns and backing vc continue to advance task    Outcome Measures:[facility] Daily Activity  Putting on and taking off regular lower body clothing: A little  Bathing (including washing, rinsing, drying): A  lot  Putting on and taking off regular upper body clothing: A little  Toileting, which includes using toilet, bedpan or urinal: A little  Taking care of personal grooming such as brushing teeth: A little  Eating Meals: A little  Daily Activity - Total Score: 17    Education Documentation  ADL Training, taught by RADHA Coelho at 10/9/2024  3:29 PM.  Learner: Patient  Readiness: Acceptance  Method: Explanation, Demonstration, Teach-back  Response: Verbalizes Understanding, Needs Reinforcement  Comment: Instructing Pt with safe transfers, balance strategies, adaptive ADL skills    Education Comments  No comments found.        OP EDUCATION:       Goals:  Encounter Problems       Encounter Problems (Active)       ADLs       Pt will complete ADL tasks at supervision with use of AE prn  (Progressing)       Start:  10/03/24    Expected End:  10/31/24               Functional Mobility       Pt will perform functional mobility household distances at supervision with use of RW.    (Progressing)       Start:  10/03/24    Expected End:  10/31/24               OT Transfers       Pt will perform functional transfers at supervision. (Progressing)       Start:  10/03/24    Expected End:  10/31/24

## 2024-10-09 NOTE — PROGRESS NOTES
Allyssa Gregg is a 79 y.o. female on day 7 of admission presenting with Fall, initial encounter.    Subjective   Patient resting in bed. Denies chest pain, shortness of breath, abdominal pain. Oriented to self.        Objective     Physical Exam  Constitutional:       Appearance: Normal appearance.   HENT:      Head: Normocephalic and atraumatic.      Mouth/Throat:      Mouth: Mucous membranes are moist.      Pharynx: Oropharynx is clear.   Eyes:      Extraocular Movements: Extraocular movements intact.      Conjunctiva/sclera: Conjunctivae normal.      Pupils: Pupils are equal, round, and reactive to light.   Cardiovascular:      Rate and Rhythm: Normal rate and regular rhythm.      Pulses: Normal pulses.      Heart sounds: Normal heart sounds.   Pulmonary:      Effort: Pulmonary effort is normal.      Breath sounds: Normal breath sounds.   Abdominal:      General: Bowel sounds are normal.      Palpations: Abdomen is soft.      Tenderness: There is no abdominal tenderness.   Musculoskeletal:         General: Normal range of motion.      Cervical back: Normal range of motion and neck supple.   Skin:     General: Skin is warm and dry.      Capillary Refill: Capillary refill takes less than 2 seconds.   Neurological:      General: No focal deficit present.      Mental Status: She is alert. She is disoriented.   Psychiatric:         Mood and Affect: Mood normal.         Behavior: Behavior normal.         Last Recorded Vitals  Blood pressure 148/81, pulse 81, temperature 36.4 °C (97.5 °F), temperature source Oral, resp. rate 18, height 1.524 m (5'), weight 66.7 kg (147 lb 0.8 oz), SpO2 95%.  Intake/Output last 3 Shifts:  I/O last 3 completed shifts:  In: 100 (1.5 mL/kg) [P.O.:100]  Out: - (0 mL/kg)   Weight: 66.7 kg     Relevant Results  Results for orders placed or performed during the hospital encounter of 10/02/24 (from the past 24 hour(s))   Basic Metabolic Panel   Result Value Ref Range    Glucose 108 (H) 74 - 99  mg/dL    Sodium 130 (L) 136 - 145 mmol/L    Potassium 3.7 3.5 - 5.3 mmol/L    Chloride 98 98 - 107 mmol/L    Bicarbonate 22 21 - 32 mmol/L    Anion Gap 14 10 - 20 mmol/L    Urea Nitrogen 14 6 - 23 mg/dL    Creatinine 0.67 0.50 - 1.05 mg/dL    eGFR 88 >60 mL/min/1.73m*2    Calcium 9.3 8.6 - 10.3 mg/dL   CBC   Result Value Ref Range    WBC 8.7 4.4 - 11.3 x10*3/uL    nRBC 0.0 0.0 - 0.0 /100 WBCs    RBC 5.16 4.00 - 5.20 x10*6/uL    Hemoglobin 16.8 (H) 12.0 - 16.0 g/dL    Hematocrit 48.4 (H) 36.0 - 46.0 %    MCV 94 80 - 100 fL    MCH 32.6 26.0 - 34.0 pg    MCHC 34.7 32.0 - 36.0 g/dL    RDW 13.7 11.5 - 14.5 %    Platelets 230 150 - 450 x10*3/uL         Assessment/Plan   Assessment & Plan  Fall, initial encounter  Unwitnessed fall, unknown circumstances  PT/OT  Improving   Plan for SNF at discharge. Await accepting facility   Family planning for long-term care once rehab complete   Hyponatremia  Mild, possibly chronic.  Stable   Monitor   Leukocytosis  Probably stress-induced  Resolved   Dementia  On donepezil  Family planning for long term care.   Alcohol use  No signs of alcohol withdrawal.   On thiamine.   Maintain abstinence     DVT prophylaxis   Lovenox     Michelle Westbrook, APRN-CNP

## 2024-10-09 NOTE — PROGRESS NOTES
Physical Therapy    Physical Therapy Treatment    Patient Name: Allyssa Gregg  MRN: [id number]  Department: [address]  Room: [address] [place] [address]  Today's Date: 10/9/2024  Time Calculation  Start Time: 1050  Stop Time: 1107  Time Calculation (min): 17 min         Assessment/Plan   PT Assessment  End of Session Communication: Bedside nurse  Assessment Comment: Pt continues to demo decreased functional mobility, increased weakness, impaired tolerance to activity, and poor safety awareness. pt is a high risk for falls and is functioning below her baseline status. pt will benefit from continued skilled PT services to improve functional performance and maximize safety.  End of Session Patient Position: Up in chair, Alarm on  PT Plan  Inpatient/Swing Bed or Outpatient: Inpatient  PT Plan  Treatment/Interventions: Bed mobility, Transfer training, Gait training, Balance training, Strengthening, Endurance training, Therapeutic exercise, Therapeutic activity  PT Plan: Ongoing PT  PT Frequency: 3 times per week  PT Discharge Recommendations: Moderate intensity level of continued care  Equipment Recommended upon Discharge: Wheeled walker  PT Recommended Transfer Status: Assist x1, Assistive device (rolling walker)  PT - OK to Discharge: Yes      General Visit Information:   PT  Visit  PT Received On: 10/09/24  General  Prior to Session Communication: Bedside nurse  Patient Position Received: Bed, 3 rail up, Alarm on  General Comment: Pt cleared for PT by nursing. Pt agreeable to PT services.    Subjective   Precautions:  Precautions  Medical Precautions: Fall precautions      Objective   Pain:  Pain Assessment  Pain Assessment: 0-10  0-10 (Numeric) Pain Score: 0 - No pain  Cognition:  Cognition  Overall Cognitive Status: Impaired at baseline (h/o dementia)    Postural Control:  Static Sitting Balance  Static Sitting-Balance Support: Bilateral upper extremity supported, Feet supported  Static Sitting-Level of Assistance: Close supervision  Static  Sitting-Comment/Number of Minutes: good seated static balance  Static Standing Balance  Static Standing-Balance Support: Bilateral upper extremity supported  Static Standing-Level of Assistance: Minimum assistance  Static Standing-Comment/Number of Minutes: fair+ balance with rolling walker    Treatments:  Therapeutic Exercise  Therapeutic Exercise Performed: Yes  Therapeutic Exercise Activity 1: Seated B LAQ x10  Therapeutic Exercise Activity 2: Seated B marches x10  Therapeutic Exercise Activity 3: Seated B ankle pumps x10  Therapeutic Exercise Activity 4: Seated B hip abd/add x10    Bed Mobility  Bed Mobility: Yes  Bed Mobility 1  Bed Mobility 1: Supine to sitting  Level of Assistance 1: Minimum assistance  Bed Mobility Comments 1: Caridad to raise trunk with cues for hand placement and ant scooting.    Ambulation/Gait Training  Ambulation/Gait Training Performed: Yes  Ambulation/Gait Training 1  Surface 1: Level tile  Device 1: Rolling walker  Assistance 1: Minimum assistance  Quality of Gait 1: Forward flexed posture, Decreased step length, Diminished heel strike  Comments/Distance (ft) 1: 15 feet x2. Caridad for walker mgmt and proximity to walker. Pt attempting to abandon walker upon approach of seat surface. Cues for fwd gaze as pt staring down at feet.  Transfers  Transfer: Yes  Transfer 1  Transfer From 1: Sit to, Stand to  Transfer to 1: Sit, Stand  Technique 1: Sit to stand, Stand to sit  Transfer Device 1: Walker  Transfer Level of Assistance 1: Contact guard  Trials/Comments 1: Cues for hand placement and ant scooting. Pt attempting to prematurely sit after abandoning FWW. Poor eccentric control.    Outcome Measures:  [facility] Basic Mobility  Turning from your back to your side while in a flat bed without using bedrails: A little  Moving from lying on your back to sitting on the side of a flat bed without using bedrails: A little  Moving to and from bed to chair (including a wheelchair): A little  Standing up  from a chair using your arms (e.g. wheelchair or bedside chair): A little  To walk in hospital room: A lot  Climbing 3-5 steps with railing: A lot  Basic Mobility - Total Score: 16      Encounter Problems       Encounter Problems (Active)       Mobility       STG - Patient will ambulate 100' with rolling walker and close supervision (Progressing)       Start:  10/03/24    Expected End:  10/31/24               PT Transfers       STG - Transfer from bed to chair with close supervision (Progressing)       Start:  10/03/24    Expected End:  10/31/24            STG - Patient to transfer to and from sit to supine with close supervision (Progressing)       Start:  10/03/24    Expected End:  10/31/24            STG - Patient will transfer sit to and from stand with close supervision (Progressing)       Start:  10/03/24    Expected End:  10/31/24               Pain - Adult

## 2024-10-10 VITALS
TEMPERATURE: 97.7 F | BODY MASS INDEX: 29.56 KG/M2 | WEIGHT: 150.57 LBS | HEIGHT: 60 IN | RESPIRATION RATE: 16 BRPM | OXYGEN SATURATION: 96 % | HEART RATE: 87 BPM | DIASTOLIC BLOOD PRESSURE: 83 MMHG | SYSTOLIC BLOOD PRESSURE: 140 MMHG
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
## 2024-10-10 PROBLEM — D72.829 LEUKOCYTOSIS: Status: RESOLVED | Noted: 2024-10-03 | Resolved: 2024-10-10

## 2024-10-10 PROCEDURE — 2500000004 HC RX 250 GENERAL PHARMACY W/ HCPCS (ALT 636 FOR OP/ED): Performed by: INTERNAL MEDICINE

## 2024-10-10 PROCEDURE — 97110 THERAPEUTIC EXERCISES: CPT | Mod: GP,CQ

## 2024-10-10 PROCEDURE — 2500000001 HC RX 250 WO HCPCS SELF ADMINISTERED DRUGS (ALT 637 FOR MEDICARE OP): Performed by: NURSE PRACTITIONER

## 2024-10-10 PROCEDURE — 99238 HOSP IP/OBS DSCHRG MGMT 30/<: CPT | Performed by: NURSE PRACTITIONER

## 2024-10-10 PROCEDURE — 2500000001 HC RX 250 WO HCPCS SELF ADMINISTERED DRUGS (ALT 637 FOR MEDICARE OP): Performed by: INTERNAL MEDICINE

## 2024-10-10 PROCEDURE — 97116 GAIT TRAINING THERAPY: CPT | Mod: GP,CQ

## 2024-10-10 RX ORDER — POTASSIUM CHLORIDE 1.5 G/1.58G
20 POWDER, FOR SOLUTION ORAL DAILY
Start: 2024-10-11

## 2024-10-10 RX ORDER — ACETAMINOPHEN 325 MG/1
650 TABLET ORAL EVERY 6 HOURS PRN
Start: 2024-10-10

## 2024-10-10 RX ORDER — ACETAMINOPHEN 500 MG
5 TABLET ORAL NIGHTLY PRN
Start: 2024-10-10

## 2024-10-10 RX ORDER — AMLODIPINE BESYLATE 5 MG/1
5 TABLET ORAL DAILY
Start: 2024-10-11

## 2024-10-10 RX ORDER — LANOLIN ALCOHOL/MO/W.PET/CERES
100 CREAM (GRAM) TOPICAL DAILY
Start: 2024-10-11

## 2024-10-10 RX ADMIN — LISINOPRIL 40 MG: 40 TABLET ORAL at 09:19

## 2024-10-10 RX ADMIN — POTASSIUM CHLORIDE 20 MEQ: 1.5 FOR SOLUTION ORAL at 09:19

## 2024-10-10 RX ADMIN — Medication 100 MG: at 09:19

## 2024-10-10 RX ADMIN — ASPIRIN 81 MG: 81 TABLET, COATED ORAL at 09:19

## 2024-10-10 RX ADMIN — ENOXAPARIN SODIUM 40 MG: 40 INJECTION SUBCUTANEOUS at 09:19

## 2024-10-10 RX ADMIN — AMLODIPINE BESYLATE 5 MG: 5 TABLET ORAL at 09:19

## 2024-10-10 ASSESSMENT — COGNITIVE AND FUNCTIONAL STATUS - GENERAL
MOBILITY SCORE: 18
TOILETING: A LITTLE
PERSONAL GROOMING: A LITTLE
CLIMB 3 TO 5 STEPS WITH RAILING: A LITTLE
TOILETING: A LITTLE
DRESSING REGULAR LOWER BODY CLOTHING: A LITTLE
MOBILITY SCORE: 17
TURNING FROM BACK TO SIDE WHILE IN FLAT BAD: A LITTLE
WALKING IN HOSPITAL ROOM: A LITTLE
PERSONAL GROOMING: A LITTLE
STANDING UP FROM CHAIR USING ARMS: A LITTLE
DRESSING REGULAR LOWER BODY CLOTHING: A LITTLE
DRESSING REGULAR UPPER BODY CLOTHING: A LITTLE
STANDING UP FROM CHAIR USING ARMS: A LITTLE
HELP NEEDED FOR BATHING: A LITTLE
EATING MEALS: A LITTLE
MOVING TO AND FROM BED TO CHAIR: A LITTLE
DAILY ACTIVITIY SCORE: 18
MOVING TO AND FROM BED TO CHAIR: A LITTLE
MOVING FROM LYING ON BACK TO SITTING ON SIDE OF FLAT BED WITH BEDRAILS: A LITTLE
WALKING IN HOSPITAL ROOM: A LITTLE
CLIMB 3 TO 5 STEPS WITH RAILING: A LOT
WALKING IN HOSPITAL ROOM: A LOT
CLIMB 3 TO 5 STEPS WITH RAILING: A LOT
DAILY ACTIVITIY SCORE: 19
STANDING UP FROM CHAIR USING ARMS: A LITTLE
MOVING FROM LYING ON BACK TO SITTING ON SIDE OF FLAT BED WITH BEDRAILS: A LITTLE
DRESSING REGULAR UPPER BODY CLOTHING: A LITTLE
MOVING FROM LYING ON BACK TO SITTING ON SIDE OF FLAT BED WITH BEDRAILS: A LITTLE
MOVING TO AND FROM BED TO CHAIR: A LITTLE
TURNING FROM BACK TO SIDE WHILE IN FLAT BAD: A LITTLE
TURNING FROM BACK TO SIDE WHILE IN FLAT BAD: A LITTLE
HELP NEEDED FOR BATHING: A LITTLE
MOBILITY SCORE: 16

## 2024-10-10 ASSESSMENT — PAIN - FUNCTIONAL ASSESSMENT
PAIN_FUNCTIONAL_ASSESSMENT: 0-10
PAIN_FUNCTIONAL_ASSESSMENT: 0-10

## 2024-10-10 ASSESSMENT — PAIN SCALES - GENERAL
PAINLEVEL_OUTOF10: 0 - NO PAIN
PAINLEVEL_OUTOF10: 0 - NO PAIN

## 2024-10-10 NOTE — DISCHARGE SUMMARY
Discharge Diagnosis  Fall, initial encounter    Issues Requiring Follow-Up  None    Discharge Meds     Medication List      START taking these medications     acetaminophen 325 mg tablet; Commonly known as: Tylenol; Take 2 tablets   (650 mg) by mouth every 6 hours if needed for fever (temp greater than   38.0 C) or mild pain (1 - 3).   amLODIPine 5 mg tablet; Commonly known as: Norvasc; Take 1 tablet (5 mg)   by mouth once daily.; Start taking on: October 11, 2024   melatonin 5 mg tablet; Take 1 tablet (5 mg) by mouth as needed at   bedtime for sleep.   potassium chloride 20 mEq packet; Commonly known as: Klor-Con; Take 20   mEq by mouth once daily.; Start taking on: October 11, 2024   thiamine 100 mg tablet; Commonly known as: Vitamin B-1; Take 1 tablet   (100 mg) by mouth once daily.; Start taking on: October 11, 2024     CONTINUE taking these medications     aspirin 81 mg EC tablet   atorvastatin 20 mg tablet; Commonly known as: Lipitor; Take 1 tablet (20   mg) by mouth once daily.   donepezil 10 mg tablet; Commonly known as: Aricept; Take 1 tablet (10   mg) by mouth once daily at bedtime.   lisinopril 40 mg tablet; Take 1 tablet (40 mg) by mouth once daily.   VITAMIN D3 ORAL       Test Results Pending At Discharge  Pending Labs       No current pending labs.            Hospital Course  H/O dementia , HTN,  CVA, COPD, and alcohol abuse . Pt lives by herself felt at home did not remember anything.   Admitted for fall, dehydration.  Hyponatremia, hypokalemia, and leukocytosis. All resolved. PT/OT recommending moderate intensity rehab. Family agreeable to SNF placement. Also, working with senior care authority for long-term placement given patient's dementia. Searching for facility with memory care unit.       Pertinent Physical Exam At Time of Discharge  Physical Exam  Constitutional:       Appearance: Normal appearance.   HENT:      Head: Normocephalic and atraumatic.      Mouth/Throat:      Mouth: Mucous  membranes are moist.      Pharynx: Oropharynx is clear.   Eyes:      Extraocular Movements: Extraocular movements intact.      Conjunctiva/sclera: Conjunctivae normal.      Pupils: Pupils are equal, round, and reactive to light.   Cardiovascular:      Rate and Rhythm: Normal rate and regular rhythm.      Pulses: Normal pulses.      Heart sounds: Normal heart sounds.   Pulmonary:      Effort: Pulmonary effort is normal.      Breath sounds: Normal breath sounds.   Abdominal:      General: Bowel sounds are normal.      Palpations: Abdomen is soft.      Tenderness: There is no abdominal tenderness.   Musculoskeletal:         General: Normal range of motion.      Cervical back: Normal range of motion and neck supple.   Skin:     General: Skin is warm and dry.      Capillary Refill: Capillary refill takes less than 2 seconds.   Neurological:      General: No focal deficit present.      Mental Status: She is alert. She is disoriented.   Psychiatric:         Mood and Affect: Mood normal.         Behavior: Behavior normal.         Outpatient Follow-Up  Future Appointments   Date Time Provider Department Center   11/7/2024  2:00 PM Jayshree Phillips MD [id number]         MAIRA Eli-CNP

## 2024-10-10 NOTE — PROGRESS NOTES
Updated PT note sent to [facility] for precert which is currently pending.     Update: Precert approved. Pt is medically discharged. [facility] called pts son Ryan, VM received, message left making him aware pt will dc to [facility] [place] today with approximate dc time. Transport has been set up via roundtrip for 4:45, 7000 completed, facility is aware.      10/10/24 1059   Discharge Planning   Expected Discharge Disposition SNF  ([facility] [place])

## 2024-10-10 NOTE — PROGRESS NOTES
Allyssa Gregg is a 79 y.o. female on day 8 of admission presenting with Fall, initial encounter.    Subjective   Patient sitting in chair at bedside. Denies chest pain, shortness of breath, abdominal pain, fevers, chills.        Objective     Physical Exam  Constitutional:       Appearance: Normal appearance.   HENT:      Head: Normocephalic and atraumatic.      Mouth/Throat:      Mouth: Mucous membranes are moist.      Pharynx: Oropharynx is clear.   Eyes:      Extraocular Movements: Extraocular movements intact.      Conjunctiva/sclera: Conjunctivae normal.      Pupils: Pupils are equal, round, and reactive to light.   Cardiovascular:      Rate and Rhythm: Normal rate and regular rhythm.      Pulses: Normal pulses.      Heart sounds: Normal heart sounds.   Pulmonary:      Effort: Pulmonary effort is normal.      Breath sounds: Normal breath sounds.   Abdominal:      General: Bowel sounds are normal.      Palpations: Abdomen is soft.      Tenderness: There is no abdominal tenderness.   Musculoskeletal:         General: Normal range of motion.      Cervical back: Normal range of motion and neck supple.   Skin:     General: Skin is warm and dry.      Capillary Refill: Capillary refill takes less than 2 seconds.   Neurological:      General: No focal deficit present.      Mental Status: She is alert. She is disoriented.   Psychiatric:         Mood and Affect: Mood normal.         Behavior: Behavior normal.         Last Recorded Vitals  Blood pressure 122/79, pulse 83, temperature 36.2 °C (97.2 °F), temperature source Oral, resp. rate 16, height 1.524 m (5'), weight 68.3 kg (150 lb 9.2 oz), SpO2 100%.  Intake/Output last 3 Shifts:  I/O last 3 completed shifts:  In: 820 (12 mL/kg) [P.O.:820]  Out: - (0 mL/kg)   Weight: 68.3 kg     Relevant Results  None     Assessment/Plan   Assessment & Plan  Fall, initial encounter  Unwitnessed fall, unknown circumstances  PT/OT  Improving   Plan for SNF at discharge. [facility] accepted,  awaiting Precert  Family planning for long-term care once rehab complete   Hyponatremia  Mild, possibly chronic.  Stable   Monitor   Leukocytosis  Probably stress-induced  Resolved   Dementia  On donepezil  Family planning for long term care.   Alcohol use  No signs of alcohol withdrawal.   On thiamine.   Maintain abstinence     DVT prophylaxis   Lovenox    Michelle Westbrook, APRN-CNP

## 2024-10-10 NOTE — NURSING NOTE
Assumed care of pt around this time.  Pt was asleep in bed with the tv on - family members just arrived and woke her up to visit.  She is happy to have visitors.  Respirations even and unlabored on room air with no s/s of distress.  No IV fluids running.  Pt denies any pain or needs at the moment.  Call light and belongings within reach.  Will be continuing to monitor.

## 2024-10-10 NOTE — PROGRESS NOTES
Physical Therapy    Physical Therapy Treatment    Patient Name: Allyssa Gregg  MRN: [id number]  Department: [address]  Room: [address] [place] [address]  Today's Date: 10/10/2024  Time Calculation  Start Time: 0901  Stop Time: 0927  Time Calculation (min): 26 min         Assessment/Plan   PT Assessment  End of Session Communication: Bedside nurse  End of Session Patient Position: Up in chair, Alarm on  PT Plan  Inpatient/Swing Bed or Outpatient: Inpatient  PT Plan  Treatment/Interventions: Bed mobility, Transfer training, Gait training, Balance training, Strengthening, Endurance training, Therapeutic exercise, Therapeutic activity  PT Plan: Ongoing PT  PT Frequency: 3 times per week  PT Discharge Recommendations: Moderate intensity level of continued care  Equipment Recommended upon Discharge: Wheeled walker  PT Recommended Transfer Status: Assist x1, Assistive device (rolling walker)  PT - OK to Discharge: Yes      General Visit Information:   PT  Visit  PT Received On: 10/10/24  General  Prior to Session Communication: Bedside nurse  Patient Position Received: Bed, 3 rail up, Alarm on  General Comment: Cleared by nursing to be seen for therapy, pt agreeable with tx, supine in bed upon arrival.    Subjective   Precautions:  Precautions  Medical Precautions: Fall precautions    Objective   Pain:  Pain Assessment  Pain Assessment: 0-10  0-10 (Numeric) Pain Score: 0 - No pain    Cognition:  Cognition  Overall Cognitive Status: Impaired at baseline  Orientation Level: Disoriented to place, Disoriented to time, Disoriented to situation  Insight: Severe  Processing Speed: Delayed     Postural Control:  Static Sitting Balance  Static Sitting-Balance Support: Bilateral upper extremity supported, Feet supported  Static Sitting-Level of Assistance: Close supervision  Static Sitting-Comment/Number of Minutes: Fair seated static balance.  Static Standing Balance  Static Standing-Balance Support: Bilateral upper extremity supported  Static  Standing-Level of Assistance: Minimum assistance  Static Standing-Comment/Number of Minutes: Fair- static standing balance with bilateral UE support on walker.    Treatments:  Therapeutic Exercise  Therapeutic Exercise Performed: Yes  Therapeutic Exercise Activity 1: Bilateral ankle pumps x15  Therapeutic Exercise Activity 2: Bilateral hip flexion x15 (Frequent visual and tactile cues to remain on task to complete therex.)  Therapeutic Exercise Activity 3: Bilateral knee extension x15  Therapeutic Exercise Activity 4: Resisted hip abd/add 15    Bed Mobility  Bed Mobility: Yes  Bed Mobility 1  Bed Mobility 1: Supine to sitting  Level of Assistance 1: Close supervision  Bed Mobility Comments 1: Increased time and effort to complete bed mobility with EOB elevated.    Ambulation/Gait Training  Ambulation/Gait Training Performed: Yes  Ambulation/Gait Training 1  Surface 1: Level tile  Device 1: Rolling walker  Assistance 1: Minimum assistance  Quality of Gait 1: Forward flexed posture, Decreased step length, Diminished heel strike  Comments/Distance (ft) 1: 20' with wheeled walker, requires cues for directional change, cues to remain in ALEXANDER of walker, bilateral soft knees noted requiring min assist for balance.    Transfers  Transfer: Yes  Transfer 1  Transfer From 1: Sit to  Transfer to 1: Stand  Technique 1: Sit to stand, Stand to sit  Transfer Device 1: Walker  Transfer Level of Assistance 1: Minimum assistance  Trials/Comments 1: Min assist for trunk up during sit to stand, cues for proper hand placement, decreased eccentric control in sitting.    Outcome Measures:  [facility] Basic Mobility  Turning from your back to your side while in a flat bed without using bedrails: A little  Moving from lying on your back to sitting on the side of a flat bed without using bedrails: A little  Moving to and from bed to chair (including a wheelchair): A little  Standing up from a chair using your arms (e.g. wheelchair or bedside  chair): A little  To walk in hospital room: A lot  Climbing 3-5 steps with railing: A lot  Basic Mobility - Total Score: 16    Encounter Problems       Encounter Problems (Active)       Mobility       STG - Patient will ambulate 100' with rolling walker and close supervision (Progressing)       Start:  10/03/24    Expected End:  10/31/24               PT Transfers       STG - Transfer from bed to chair with close supervision (Progressing)       Start:  10/03/24    Expected End:  10/31/24            STG - Patient to transfer to and from sit to supine with close supervision (Progressing)       Start:  10/03/24    Expected End:  10/31/24            STG - Patient will transfer sit to and from stand with close supervision (Progressing)       Start:  10/03/24    Expected End:  10/31/24               Pain - Adult

## 2024-10-10 NOTE — PROGRESS NOTES
Occupational Therapy                 Therapy Communication Note    Patient Name: Allyssa Gregg  MRN: [id number]  Department: [address]  Room: [address] [place] [address]  Today's Date: 10/10/2024     Discipline: Occupational Therapy    Missed Visit Reason: Missed Visit Reason: Patient refused (Education and encouragement provided however pt continued to decline participation in OT session this date.)    Missed Time: Attempt at 1401    Comment:

## 2024-10-10 NOTE — CARE PLAN
The patient's goals for the shift include rest    The clinical goals for the shift include Stable VS, safety    Over the shift, the patient did make progress toward the following goals.       Problem: Fall/Injury  Goal: Verbalize understanding of personal risk factors for fall in the hospital  Outcome: Progressing     Problem: Fall/Injury  Goal: Verbalize understanding of risk factor reduction measures to prevent injury from fall in the home  Outcome: Progressing     Problem: Fall/Injury  Goal: Pace activities to prevent fatigue by end of the shift  Outcome: Progressing     Problem: Skin  Goal: Prevent/minimize sheer/friction injuries  Outcome: Progressing     Problem: Skin  Goal: Promote/optimize nutrition  Outcome: Progressing     Problem: Skin  Goal: Promote skin healing  Outcome: Progressing     Problem: Pain - Adult  Goal: Verbalizes/displays adequate comfort level or baseline comfort level  Outcome: Progressing     Problem: Discharge Planning  Goal: Discharge to home or other facility with appropriate resources  Outcome: Progressing     Problem: Chronic Conditions and Co-morbidities  Goal: Patient's chronic conditions and co-morbidity symptoms are monitored and maintained or improved  Outcome: Progressing

## 2024-10-10 NOTE — ASSESSMENT & PLAN NOTE
Unwitnessed fall, unknown circumstances  PT/OT  Improving   Plan for SNF at discharge. [facility], awaiting Precert  Family planning for long-term care once rehab complete

## 2024-10-31 ENCOUNTER — HOSPITAL ENCOUNTER (EMERGENCY)
Facility: HOSPITAL | Age: 79
Discharge: HOME | End: 2024-10-31
Payer: COMMERCIAL

## 2024-10-31 VITALS
HEIGHT: 60 IN | DIASTOLIC BLOOD PRESSURE: 57 MMHG | RESPIRATION RATE: 16 BRPM | TEMPERATURE: 97.6 F | HEART RATE: 68 BPM | SYSTOLIC BLOOD PRESSURE: 121 MMHG | BODY MASS INDEX: 28.87 KG/M2 | WEIGHT: 147.05 LBS | OXYGEN SATURATION: 98 %

## 2024-10-31 DIAGNOSIS — N30.01 ACUTE CYSTITIS WITH HEMATURIA: Primary | ICD-10-CM

## 2024-10-31 LAB
APPEARANCE UR: CLEAR
BACTERIA #/AREA URNS AUTO: ABNORMAL /HPF
BILIRUB UR STRIP.AUTO-MCNC: NEGATIVE MG/DL
COLOR UR: ABNORMAL
GLUCOSE UR STRIP.AUTO-MCNC: NORMAL MG/DL
KETONES UR STRIP.AUTO-MCNC: NEGATIVE MG/DL
LEUKOCYTE ESTERASE UR QL STRIP.AUTO: ABNORMAL
NITRITE UR QL STRIP.AUTO: NEGATIVE
PH UR STRIP.AUTO: 6.5 [PH]
PROT UR STRIP.AUTO-MCNC: NEGATIVE MG/DL
RBC # UR STRIP.AUTO: ABNORMAL /UL
RBC #/AREA URNS AUTO: ABNORMAL /HPF
SP GR UR STRIP.AUTO: 1.01
SQUAMOUS #/AREA URNS AUTO: ABNORMAL /HPF
TRANS CELLS #/AREA UR COMP ASSIST: ABNORMAL /HPF
UROBILINOGEN UR STRIP.AUTO-MCNC: NORMAL MG/DL
WBC #/AREA URNS AUTO: ABNORMAL /HPF

## 2024-10-31 PROCEDURE — 81001 URINALYSIS AUTO W/SCOPE: CPT

## 2024-10-31 PROCEDURE — P9612 CATHETERIZE FOR URINE SPEC: HCPCS

## 2024-10-31 PROCEDURE — 99283 EMERGENCY DEPT VISIT LOW MDM: CPT

## 2024-10-31 PROCEDURE — 87086 URINE CULTURE/COLONY COUNT: CPT | Mod: WESLAB

## 2024-10-31 ASSESSMENT — LIFESTYLE VARIABLES
HAVE PEOPLE ANNOYED YOU BY CRITICIZING YOUR DRINKING: NO
TOTAL SCORE: 0
HAVE YOU EVER FELT YOU SHOULD CUT DOWN ON YOUR DRINKING: NO
EVER FELT BAD OR GUILTY ABOUT YOUR DRINKING: NO
EVER HAD A DRINK FIRST THING IN THE MORNING TO STEADY YOUR NERVES TO GET RID OF A HANGOVER: NO

## 2024-10-31 ASSESSMENT — PAIN SCALES - GENERAL
PAINLEVEL_OUTOF10: 0 - NO PAIN

## 2024-10-31 ASSESSMENT — PAIN - FUNCTIONAL ASSESSMENT: PAIN_FUNCTIONAL_ASSESSMENT: 0-10

## 2024-11-01 LAB — HOLD SPECIMEN: NORMAL

## 2024-11-02 LAB — BACTERIA UR CULT: ABNORMAL

## 2024-11-04 ENCOUNTER — TELEPHONE (OUTPATIENT)
Dept: PHARMACY | Facility: HOSPITAL | Age: 79
End: 2024-11-04
Payer: COMMERCIAL

## 2024-11-05 ENCOUNTER — TELEPHONE (OUTPATIENT)
Dept: PHARMACY | Facility: HOSPITAL | Age: 79
End: 2024-11-05
Payer: COMMERCIAL

## 2024-11-05 NOTE — PROGRESS NOTES
EDPD Note: Lab/Chart Reviewed    Reviewed Mr./Mrs./Ms. Allyssa Gregg 's chart regarding a positive urine culture/result that was taken during their recent emergency room visit. The patient was transferred back to their rehab/LTC facility .Therefore, I have faxed this information to [facility] at fax number [phone] .    Urine Culture Multiple organisms present, probable contamination. Repeat culture if clinically indicated. Abnormal         Resulting Agency: [facility]          Specimen Collected: 10/31/24 19:49 Last Resulted: 11/02/24 08:27     No further follow up needed from EDPD Team.     Litzy Morton, PharmD   Meds PGY-1 Resident

## 2024-11-07 ENCOUNTER — APPOINTMENT (OUTPATIENT)
Dept: PRIMARY CARE | Facility: CLINIC | Age: 79
End: 2024-11-07
Payer: COMMERCIAL

## 2024-11-14 ENCOUNTER — NURSING HOME VISIT (OUTPATIENT)
Dept: POST ACUTE CARE | Facility: EXTERNAL LOCATION | Age: 79
End: 2024-11-14
Payer: COMMERCIAL

## 2024-11-14 DIAGNOSIS — F10.10 ETOH ABUSE: ICD-10-CM

## 2024-11-14 DIAGNOSIS — E53.8 VITAMIN B 12 DEFICIENCY: ICD-10-CM

## 2024-11-14 DIAGNOSIS — E55.9 VITAMIN D DEFICIENCY: ICD-10-CM

## 2024-11-14 DIAGNOSIS — I10 ESSENTIAL HYPERTENSION: Primary | ICD-10-CM

## 2024-11-14 DIAGNOSIS — Z78.9 LIVING IN ASSISTED LIVING: ICD-10-CM

## 2024-11-14 DIAGNOSIS — F03.90 DEMENTIA WITHOUT BEHAVIORAL DISTURBANCE (MULTI): ICD-10-CM

## 2024-11-14 DIAGNOSIS — E78.2 MIXED HYPERLIPIDEMIA: ICD-10-CM

## 2024-11-14 PROCEDURE — 99349 HOME/RES VST EST MOD MDM 40: CPT

## 2024-11-14 ASSESSMENT — PAIN SCALES - GENERAL: PAINLEVEL_OUTOF10: 0-NO PAIN

## 2024-11-14 NOTE — LETTER
"Patient: Allyssa Gregg  : 1945    Encounter Date: 2024    Subjective  Patient ID: Allyssa Gregg is a 79 y.o. female who is assisted living/ home patient being seen at [facility], and evaluated to Establish Care.     HPI     Pt visited in apartment of assisted living. Pt laying in bed, oriented to self, unable to state , time or place. Pt comfortable and denies pain. Pt welcomes visit. Pt a poor historian. Denies changes in vision, and appears to hear normal tone of voice. Poor dentition, denies difficulty with chewing and swallowing. Appetite fair. Pt denies chest discomfort, sob at rest/exertion, voiding symptoms and constipation. Pt utilizes wheelchair for mobility. Requires assistance with ADL's. Pt responds to most questions \"I don't think so\", or \"I don't know\". When asked if pt had children, pt stated \"I don't think so\". Per facility chart, pt has a son. Pt with no concerns at this time. HPI limited due to cognition. Collateral information obtained from nursing.     Current Outpatient Medications on File Prior to Visit   Medication Sig Dispense Refill   • amLODIPine (Norvasc) 5 mg tablet Take 1 tablet (5 mg) by mouth once daily.     • aspirin 81 mg EC tablet TAKE 1 TABLET BY MOUTH ONCE DAILY FOR PAIN 17 tablet 0   • atorvastatin (Lipitor) 20 mg tablet Take 1 tablet (20 mg) by mouth once daily. 90 tablet 3   • cholecalciferol, vitamin D3, (VITAMIN D3 ORAL) Take by mouth 1 (one) time per week.     • cyanocobalamin (Vitamin B-12) 100 mcg tablet Take 1 tablet (100 mcg) by mouth once daily.     • donepezil (Aricept) 10 mg tablet Take 1 tablet (10 mg) by mouth once daily at bedtime. 90 tablet 1   • lisinopril 40 mg tablet TAKE 1 TABLET BY MOUTH ONCE DAILY 17 tablet 0   • melatonin 5 mg tablet Take 1 tablet (5 mg) by mouth as needed at bedtime for sleep.     • mirtazapine (Remeron) 15 mg tablet TAKE 1 TABLET BY MOUTH AT BEDTIME FOR INCREASED APPETITE 17 tablet 0   • potassium chloride CR 20 mEq ER tablet " TAKE 1 TABLET BY MOUTH ONCE DAILY 17 tablet 0   • thiamine 100 mg tablet TAKE 1 TABLET BY MOUTH ONCE DAILY FOR SUPPLEMENT 17 tablet 0   • [DISCONTINUED] potassium chloride (Klor-Con) 20 mEq packet Take 20 mEq by mouth once daily.     • acetaminophen (Tylenol) 325 mg tablet Take 2 tablets (650 mg) by mouth every 6 hours if needed for fever (temp greater than 38.0 C) or mild pain (1 - 3). (Patient not taking: Reported on 11/17/2024)     • [DISCONTINUED] aspirin 81 mg EC tablet Take 1 tablet (81 mg) by mouth once daily.     • [DISCONTINUED] lisinopril 40 mg tablet Take 1 tablet (40 mg) by mouth once daily. 90 tablet 1   • [DISCONTINUED] thiamine (Vitamin B-1) 100 mg tablet Take 1 tablet (100 mg) by mouth once daily.       No current facility-administered medications on file prior to visit.      Review of Systems   Constitutional:  Negative for activity change, appetite change, chills and fatigue.   HENT:  Positive for dental problem. Negative for congestion and rhinorrhea.    Eyes:  Negative for visual disturbance.   Respiratory:  Negative for cough and shortness of breath.    Cardiovascular:  Negative for leg swelling.   Gastrointestinal:  Negative for constipation, diarrhea, nausea and vomiting.   Genitourinary:  Negative for frequency.   Musculoskeletal:  Positive for gait problem.        Utilizes wheelchair for mobility.    Neurological:  Negative for dizziness, weakness and headaches.   Psychiatric/Behavioral:  Positive for confusion. Negative for sleep disturbance. The patient is not nervous/anxious.        Objective  /70 (BP Location: Right arm, Patient Position: Lying)   Pulse 75   Resp 16   Ht 1.524 m (5')   Wt 61.7 kg (136 lb)   SpO2 94%   BMI 26.56 kg/m²     Physical Exam  Constitutional:       General: She is awake. She is not in acute distress.     Appearance: Normal appearance. She is not ill-appearing.   HENT:      Head: Normocephalic.      Nose: No congestion or rhinorrhea.       Mouth/Throat:      Mouth: Mucous membranes are moist.   Eyes:      Conjunctiva/sclera: Conjunctivae normal.      Pupils: Pupils are equal, round, and reactive to light.   Cardiovascular:      Rate and Rhythm: Normal rate and regular rhythm.      Pulses: Normal pulses.      Heart sounds: Normal heart sounds.   Pulmonary:      Effort: Pulmonary effort is normal.      Breath sounds: Normal breath sounds.   Abdominal:      General: Bowel sounds are normal.      Palpations: Abdomen is soft.   Musculoskeletal:         General: Normal range of motion.      Cervical back: Normal range of motion.      Right lower leg: No edema.      Left lower leg: No edema.   Skin:     General: Skin is warm.      Capillary Refill: Capillary refill takes less than 2 seconds.   Neurological:      Mental Status: She is alert. Mental status is at baseline.      Comments: Oriented x1   Psychiatric:         Mood and Affect: Mood normal.         Speech: Speech normal.         Behavior: Behavior is cooperative.         Cognition and Memory: Cognition is impaired. Memory is impaired.         Assessment/Plan  Diagnoses and all orders for this visit:  Essential hypertension  Comments:  Blood pressure controlled. Continue amlodipine and lisinopril.  Dementia without behavioral disturbance (Multi)  Comments:  Continue to monitor for cognitive decline.  ETOH abuse  Comments:  Continue thiamine.  Vitamin D deficiency  Comments:  Continue Vitamin D.  Mixed hyperlipidemia  Comments:  Continue atorvastatin.  Vitamin B 12 deficiency  Comments:  Continue Vitamin B.  Living in assisted living  Comments:  Medical and facility chart reviewed, medication reconciled and collaboration with nursing. AL admission paperwork completed. Physician orders signed.            Electronically Signed By: MAIRA Santos-CNP   11/19/24 10:52 AM

## 2024-11-17 RX ORDER — PNV NO.95/FERROUS FUM/FOLIC AC 28MG-0.8MG
100 TABLET ORAL DAILY
COMMUNITY

## 2024-11-17 ASSESSMENT — ENCOUNTER SYMPTOMS
RHINORRHEA: 0
SHORTNESS OF BREATH: 0
DIARRHEA: 0
ACTIVITY CHANGE: 0
FATIGUE: 0
COUGH: 0
CHILLS: 0
VOMITING: 0
CONFUSION: 1
APPETITE CHANGE: 0
NERVOUS/ANXIOUS: 0
WEAKNESS: 0
SLEEP DISTURBANCE: 0
HEADACHES: 0
NAUSEA: 0
DIZZINESS: 0
CONSTIPATION: 0
FREQUENCY: 0

## 2024-11-17 NOTE — PROGRESS NOTES
"Subjective   Patient ID: Allyssa Gregg is a 79 y.o. female who is assisted living/ home patient being seen at [facility], and evaluated to Establish Care.     HPI     Pt visited in apartment of assisted living. Pt laying in bed, oriented to self, unable to state , time or place. Pt comfortable and denies pain. Pt welcomes visit. Pt a poor historian. Denies changes in vision, and appears to hear normal tone of voice. Poor dentition, denies difficulty with chewing and swallowing. Appetite fair. Pt denies chest discomfort, sob at rest/exertion, voiding symptoms and constipation. Pt utilizes wheelchair for mobility. Requires assistance with ADL's. Pt responds to most questions \"I don't think so\", or \"I don't know\". When asked if pt had children, pt stated \"I don't think so\". Per facility chart, pt has a son. Pt with no concerns at this time. HPI limited due to cognition. Collateral information obtained from nursing.     Current Outpatient Medications on File Prior to Visit   Medication Sig Dispense Refill    amLODIPine (Norvasc) 5 mg tablet Take 1 tablet (5 mg) by mouth once daily.      aspirin 81 mg EC tablet TAKE 1 TABLET BY MOUTH ONCE DAILY FOR PAIN 17 tablet 0    atorvastatin (Lipitor) 20 mg tablet Take 1 tablet (20 mg) by mouth once daily. 90 tablet 3    cholecalciferol, vitamin D3, (VITAMIN D3 ORAL) Take by mouth 1 (one) time per week.      cyanocobalamin (Vitamin B-12) 100 mcg tablet Take 1 tablet (100 mcg) by mouth once daily.      donepezil (Aricept) 10 mg tablet Take 1 tablet (10 mg) by mouth once daily at bedtime. 90 tablet 1    lisinopril 40 mg tablet TAKE 1 TABLET BY MOUTH ONCE DAILY 17 tablet 0    melatonin 5 mg tablet Take 1 tablet (5 mg) by mouth as needed at bedtime for sleep.      mirtazapine (Remeron) 15 mg tablet TAKE 1 TABLET BY MOUTH AT BEDTIME FOR INCREASED APPETITE 17 tablet 0    potassium chloride CR 20 mEq ER tablet TAKE 1 TABLET BY MOUTH ONCE DAILY 17 tablet 0    thiamine 100 mg tablet TAKE " 1 TABLET BY MOUTH ONCE DAILY FOR SUPPLEMENT 17 tablet 0    [DISCONTINUED] potassium chloride (Klor-Con) 20 mEq packet Take 20 mEq by mouth once daily.      acetaminophen (Tylenol) 325 mg tablet Take 2 tablets (650 mg) by mouth every 6 hours if needed for fever (temp greater than 38.0 C) or mild pain (1 - 3). (Patient not taking: Reported on 11/17/2024)      [DISCONTINUED] aspirin 81 mg EC tablet Take 1 tablet (81 mg) by mouth once daily.      [DISCONTINUED] lisinopril 40 mg tablet Take 1 tablet (40 mg) by mouth once daily. 90 tablet 1    [DISCONTINUED] thiamine (Vitamin B-1) 100 mg tablet Take 1 tablet (100 mg) by mouth once daily.       No current facility-administered medications on file prior to visit.      Review of Systems   Constitutional:  Negative for activity change, appetite change, chills and fatigue.   HENT:  Positive for dental problem. Negative for congestion and rhinorrhea.    Eyes:  Negative for visual disturbance.   Respiratory:  Negative for cough and shortness of breath.    Cardiovascular:  Negative for leg swelling.   Gastrointestinal:  Negative for constipation, diarrhea, nausea and vomiting.   Genitourinary:  Negative for frequency.   Musculoskeletal:  Positive for gait problem.        Utilizes wheelchair for mobility.    Neurological:  Negative for dizziness, weakness and headaches.   Psychiatric/Behavioral:  Positive for confusion. Negative for sleep disturbance. The patient is not nervous/anxious.        Objective   /70 (BP Location: Right arm, Patient Position: Lying)   Pulse 75   Resp 16   Ht 1.524 m (5')   Wt 61.7 kg (136 lb)   SpO2 94%   BMI 26.56 kg/m²     Physical Exam  Constitutional:       General: She is awake. She is not in acute distress.     Appearance: Normal appearance. She is not ill-appearing.   HENT:      Head: Normocephalic.      Nose: No congestion or rhinorrhea.      Mouth/Throat:      Mouth: Mucous membranes are moist.   Eyes:      Conjunctiva/sclera:  Conjunctivae normal.      Pupils: Pupils are equal, round, and reactive to light.   Cardiovascular:      Rate and Rhythm: Normal rate and regular rhythm.      Pulses: Normal pulses.      Heart sounds: Normal heart sounds.   Pulmonary:      Effort: Pulmonary effort is normal.      Breath sounds: Normal breath sounds.   Abdominal:      General: Bowel sounds are normal.      Palpations: Abdomen is soft.   Musculoskeletal:         General: Normal range of motion.      Cervical back: Normal range of motion.      Right lower leg: No edema.      Left lower leg: No edema.   Skin:     General: Skin is warm.      Capillary Refill: Capillary refill takes less than 2 seconds.   Neurological:      Mental Status: She is alert. Mental status is at baseline.      Comments: Oriented x1   Psychiatric:         Mood and Affect: Mood normal.         Speech: Speech normal.         Behavior: Behavior is cooperative.         Cognition and Memory: Cognition is impaired. Memory is impaired.         Assessment/Plan   Diagnoses and all orders for this visit:  Essential hypertension  Comments:  Blood pressure controlled. Continue amlodipine and lisinopril.  Dementia without behavioral disturbance (Multi)  Comments:  Continue to monitor for cognitive decline.  ETOH abuse  Comments:  Continue thiamine.  Vitamin D deficiency  Comments:  Continue Vitamin D.  Mixed hyperlipidemia  Comments:  Continue atorvastatin.  Vitamin B 12 deficiency  Comments:  Continue Vitamin B.  Living in assisted living  Comments:  Medical and facility chart reviewed, medication reconciled and collaboration with nursing. AL admission paperwork completed. Physician orders signed.

## 2024-11-19 VITALS
SYSTOLIC BLOOD PRESSURE: 110 MMHG | HEIGHT: 60 IN | HEART RATE: 75 BPM | RESPIRATION RATE: 16 BRPM | WEIGHT: 136 LBS | BODY MASS INDEX: 26.7 KG/M2 | OXYGEN SATURATION: 94 % | DIASTOLIC BLOOD PRESSURE: 70 MMHG

## 2024-11-27 DIAGNOSIS — E78.2 MIXED HYPERLIPIDEMIA: ICD-10-CM

## 2024-11-27 DIAGNOSIS — F03.90 DEMENTIA WITHOUT BEHAVIORAL DISTURBANCE (MULTI): ICD-10-CM

## 2024-11-27 RX ORDER — DONEPEZIL HYDROCHLORIDE 10 MG/1
10 TABLET, FILM COATED ORAL NIGHTLY
Qty: 17 TABLET | OUTPATIENT
Start: 2024-11-27

## 2024-11-27 RX ORDER — ATORVASTATIN CALCIUM 20 MG/1
TABLET, FILM COATED ORAL
Qty: 17 TABLET | OUTPATIENT
Start: 2024-11-27

## 2025-02-17 ENCOUNTER — NURSING HOME VISIT (OUTPATIENT)
Dept: POST ACUTE CARE | Facility: EXTERNAL LOCATION | Age: 80
End: 2025-02-17
Payer: COMMERCIAL

## 2025-02-17 DIAGNOSIS — I10 ESSENTIAL HYPERTENSION: ICD-10-CM

## 2025-02-17 DIAGNOSIS — Z78.9 LIVING IN ASSISTED LIVING: ICD-10-CM

## 2025-02-17 DIAGNOSIS — E53.8 VITAMIN B 12 DEFICIENCY: ICD-10-CM

## 2025-02-17 DIAGNOSIS — F03.90 DEMENTIA WITHOUT BEHAVIORAL DISTURBANCE (MULTI): Primary | ICD-10-CM

## 2025-02-17 DIAGNOSIS — E78.2 MIXED HYPERLIPIDEMIA: ICD-10-CM

## 2025-02-17 DIAGNOSIS — E55.9 VITAMIN D DEFICIENCY: ICD-10-CM

## 2025-02-17 PROCEDURE — 99349 HOME/RES VST EST MOD MDM 40: CPT

## 2025-02-17 ASSESSMENT — PAIN SCALES - GENERAL: PAINLEVEL_OUTOF10: 0-NO PAIN

## 2025-02-17 NOTE — LETTER
"Patient: Allyssa Gregg  : 1945    Encounter Date: 2025    Subjective  Patient ID: Allyssa Gregg is a 79 y.o. female who is assisted living/ home patient being seen at [facility], and evaluated for Routine AL Visit.     HPI   Pt visited in apartment of assisted living. Pt laying on couch watching TV. Oriented to self, unable to state , time or place, and denies pain. Pt a poor historian. Denies changes in vision, and appears to hear normal tone of voice. Poor dentition, denies difficulty with chewing and swallowing, and states appetite is good. Pt unable to recall what was eaten today for breakfast. Denies chest discomfort, sob at rest/exertion, voiding symptoms and constipation. Pt utilizes wheelchair for mobility. No recent falls reported. Requires assistance with ADL's. Pt responds to most questions \"I don't know, or I don't remember\". Pt with no concerns at this time. HPI limited due to cognition secondary to dementia. Collateral information obtained from nursing.     Current Outpatient Medications on File Prior to Visit   Medication Sig Dispense Refill   • acetaminophen (Tylenol) 325 mg tablet Take 2 tablets (650 mg) by mouth every 6 hours if needed for fever (temp greater than 38.0 C) or mild pain (1 - 3).     • amLODIPine (Norvasc) 5 mg tablet Take 1 tablet (5 mg) by mouth once daily.     • aspirin 81 mg EC tablet TAKE 1 TABLET BY MOUTH ONCE DAILY FOR PAIN 14 tablet 0   • atorvastatin (Lipitor) 20 mg tablet TAKE 1 TABLET BY MOUTH AT BEDTIME FOR LOWRER CHOLESTEROL 14 tablet 0   • cholecalciferol, vitamin D3, (VITAMIN D3 ORAL) Take by mouth 1 (one) time per week.     • cyanocobalamin (Vitamin B-12) 100 mcg tablet Take 1 tablet (100 mcg) by mouth once daily.     • donepezil (Aricept) 10 mg tablet TAKE 1 TABLET BY MOUTH AT BEDTIME 14 tablet 0   • lisinopril 40 mg tablet TAKE 1 TABLET BY MOUTH ONCE DAILY 14 tablet 0   • melatonin 5 mg tablet Take 1 tablet (5 mg) by mouth as needed at bedtime for sleep. "     • mirtazapine (Remeron) 15 mg tablet TAKE 1 TABLET BY MOUTH AT BEDTIME FOR INCREASED APPETITE 14 tablet 0   • potassium chloride CR 20 mEq ER tablet TAKE 1 TABLET BY MOUTH ONCE DAILY 14 tablet 0   • thiamine 100 mg tablet TAKE 1 TABLET BY MOUTH ONCE DAILY FOR SUPPLEMENT 14 tablet 0     No current facility-administered medications on file prior to visit.        Review of Systems   Constitutional:  Negative for activity change, appetite change, chills and fatigue.   HENT:  Positive for dental problem. Negative for congestion and rhinorrhea.    Eyes:  Negative for visual disturbance.   Respiratory:  Negative for cough and shortness of breath.    Cardiovascular:  Negative for leg swelling.   Gastrointestinal:  Negative for constipation, diarrhea, nausea and vomiting.   Genitourinary:  Negative for frequency.   Musculoskeletal:  Positive for gait problem.        Utilizes wheelchair for mobility.    Neurological:  Negative for dizziness, weakness and headaches.   Psychiatric/Behavioral:  Positive for confusion. Negative for sleep disturbance. The patient is not nervous/anxious.        Objective  /60   Pulse 90   Resp 16   Wt 55.8 kg (123 lb)   SpO2 98%   BMI 24.02 kg/m²     Physical Exam  Constitutional:       General: She is awake. She is not in acute distress.     Appearance: Normal appearance. She is not ill-appearing.   HENT:      Head: Normocephalic.      Nose: No congestion or rhinorrhea.      Mouth/Throat:      Mouth: Mucous membranes are moist.   Eyes:      Conjunctiva/sclera: Conjunctivae normal.      Pupils: Pupils are equal, round, and reactive to light.   Cardiovascular:      Rate and Rhythm: Normal rate and regular rhythm.      Pulses: Normal pulses.      Heart sounds: Normal heart sounds.   Pulmonary:      Effort: Pulmonary effort is normal.      Breath sounds: Normal breath sounds.   Abdominal:      General: Bowel sounds are normal.      Palpations: Abdomen is soft.   Musculoskeletal:          General: Normal range of motion.      Cervical back: Normal range of motion.      Right lower leg: No edema.      Left lower leg: No edema.   Skin:     General: Skin is warm.      Capillary Refill: Capillary refill takes less than 2 seconds.   Neurological:      Mental Status: She is alert. Mental status is at baseline.      Comments: Oriented x1   Psychiatric:         Mood and Affect: Mood normal.         Speech: Speech normal.         Behavior: Behavior is cooperative.         Cognition and Memory: Cognition is impaired. Memory is impaired.         Assessment/Plan  Diagnoses and all orders for this visit:  Dementia without behavioral disturbance (Multi)  Comments:  Continue to monitor for cognitive decline.  Essential hypertension  Comments:  Blood pressure controlled.  Vitamin D deficiency  Comments:  Continue vitamin D. Obtain Vitamin D level.  Mixed hyperlipidemia  Comments:  Continue atorvastatin. Obtain lipid panel.  Vitamin B 12 deficiency  Comments:  Continue Vitamin B. Obtain Vitamin B level.  Living in assisted living  Comments:  Medical and facility chart reviewed, medications reconciled and collaboration with nursing. CMP, CBC, Vit B & D, A1C, and lipid panel ordered at AL.          Electronically Signed By: AGUEDA Santos   2/18/25  9:13 PM

## 2025-02-18 VITALS
HEART RATE: 90 BPM | OXYGEN SATURATION: 98 % | RESPIRATION RATE: 16 BRPM | BODY MASS INDEX: 24.02 KG/M2 | SYSTOLIC BLOOD PRESSURE: 100 MMHG | DIASTOLIC BLOOD PRESSURE: 60 MMHG | WEIGHT: 123 LBS

## 2025-02-18 ASSESSMENT — ENCOUNTER SYMPTOMS
ACTIVITY CHANGE: 0
DIZZINESS: 0
APPETITE CHANGE: 0
CONFUSION: 1
RHINORRHEA: 0
CONSTIPATION: 0
SLEEP DISTURBANCE: 0
HEADACHES: 0
WEAKNESS: 0
FREQUENCY: 0
DIARRHEA: 0
COUGH: 0
CHILLS: 0
SHORTNESS OF BREATH: 0
NAUSEA: 0
FATIGUE: 0
VOMITING: 0
NERVOUS/ANXIOUS: 0

## 2025-02-19 NOTE — PROGRESS NOTES
"Subjective   Patient ID: Allyssa Gregg is a 79 y.o. female who is assisted living/ home patient being seen at [facility], and evaluated for Routine AL Visit.     HPI   Pt visited in apartment of assisted living. Pt laying on couch watching TV. Oriented to self, unable to state , time or place, and denies pain. Pt a poor historian. Denies changes in vision, and appears to hear normal tone of voice. Poor dentition, denies difficulty with chewing and swallowing, and states appetite is good. Pt unable to recall what was eaten today for breakfast. Denies chest discomfort, sob at rest/exertion, voiding symptoms and constipation. Pt utilizes wheelchair for mobility. No recent falls reported. Requires assistance with ADL's. Pt responds to most questions \"I don't know, or I don't remember\". Pt with no concerns at this time. HPI limited due to cognition secondary to dementia. Collateral information obtained from nursing.     Current Outpatient Medications on File Prior to Visit   Medication Sig Dispense Refill    acetaminophen (Tylenol) 325 mg tablet Take 2 tablets (650 mg) by mouth every 6 hours if needed for fever (temp greater than 38.0 C) or mild pain (1 - 3).      amLODIPine (Norvasc) 5 mg tablet Take 1 tablet (5 mg) by mouth once daily.      aspirin 81 mg EC tablet TAKE 1 TABLET BY MOUTH ONCE DAILY FOR PAIN 14 tablet 0    atorvastatin (Lipitor) 20 mg tablet TAKE 1 TABLET BY MOUTH AT BEDTIME FOR LOWRER CHOLESTEROL 14 tablet 0    cholecalciferol, vitamin D3, (VITAMIN D3 ORAL) Take by mouth 1 (one) time per week.      cyanocobalamin (Vitamin B-12) 100 mcg tablet Take 1 tablet (100 mcg) by mouth once daily.      donepezil (Aricept) 10 mg tablet TAKE 1 TABLET BY MOUTH AT BEDTIME 14 tablet 0    lisinopril 40 mg tablet TAKE 1 TABLET BY MOUTH ONCE DAILY 14 tablet 0    melatonin 5 mg tablet Take 1 tablet (5 mg) by mouth as needed at bedtime for sleep.      mirtazapine (Remeron) 15 mg tablet TAKE 1 TABLET BY MOUTH AT BEDTIME " FOR INCREASED APPETITE 14 tablet 0    potassium chloride CR 20 mEq ER tablet TAKE 1 TABLET BY MOUTH ONCE DAILY 14 tablet 0    thiamine 100 mg tablet TAKE 1 TABLET BY MOUTH ONCE DAILY FOR SUPPLEMENT 14 tablet 0     No current facility-administered medications on file prior to visit.        Review of Systems   Constitutional:  Negative for activity change, appetite change, chills and fatigue.   HENT:  Positive for dental problem. Negative for congestion and rhinorrhea.    Eyes:  Negative for visual disturbance.   Respiratory:  Negative for cough and shortness of breath.    Cardiovascular:  Negative for leg swelling.   Gastrointestinal:  Negative for constipation, diarrhea, nausea and vomiting.   Genitourinary:  Negative for frequency.   Musculoskeletal:  Positive for gait problem.        Utilizes wheelchair for mobility.    Neurological:  Negative for dizziness, weakness and headaches.   Psychiatric/Behavioral:  Positive for confusion. Negative for sleep disturbance. The patient is not nervous/anxious.        Objective   /60   Pulse 90   Resp 16   Wt 55.8 kg (123 lb)   SpO2 98%   BMI 24.02 kg/m²     Physical Exam  Constitutional:       General: She is awake. She is not in acute distress.     Appearance: Normal appearance. She is not ill-appearing.   HENT:      Head: Normocephalic.      Nose: No congestion or rhinorrhea.      Mouth/Throat:      Mouth: Mucous membranes are moist.   Eyes:      Conjunctiva/sclera: Conjunctivae normal.      Pupils: Pupils are equal, round, and reactive to light.   Cardiovascular:      Rate and Rhythm: Normal rate and regular rhythm.      Pulses: Normal pulses.      Heart sounds: Normal heart sounds.   Pulmonary:      Effort: Pulmonary effort is normal.      Breath sounds: Normal breath sounds.   Abdominal:      General: Bowel sounds are normal.      Palpations: Abdomen is soft.   Musculoskeletal:         General: Normal range of motion.      Cervical back: Normal range of  motion.      Right lower leg: No edema.      Left lower leg: No edema.   Skin:     General: Skin is warm.      Capillary Refill: Capillary refill takes less than 2 seconds.   Neurological:      Mental Status: She is alert. Mental status is at baseline.      Comments: Oriented x1   Psychiatric:         Mood and Affect: Mood normal.         Speech: Speech normal.         Behavior: Behavior is cooperative.         Cognition and Memory: Cognition is impaired. Memory is impaired.         Assessment/Plan   Diagnoses and all orders for this visit:  Dementia without behavioral disturbance (Multi)  Comments:  Continue to monitor for cognitive decline.  Essential hypertension  Comments:  Blood pressure controlled.  Vitamin D deficiency  Comments:  Continue vitamin D. Obtain Vitamin D level.  Mixed hyperlipidemia  Comments:  Continue atorvastatin. Obtain lipid panel.  Vitamin B 12 deficiency  Comments:  Continue Vitamin B. Obtain Vitamin B level.  Living in assisted living  Comments:  Medical and facility chart reviewed, medications reconciled and collaboration with nursing. CMP, CBC, Vit B & D, A1C, and lipid panel ordered at AL.

## 2025-03-18 DIAGNOSIS — E78.2 MIXED HYPERLIPIDEMIA: ICD-10-CM

## 2025-03-18 DIAGNOSIS — F03.90 DEMENTIA WITHOUT BEHAVIORAL DISTURBANCE (MULTI): ICD-10-CM

## 2025-03-18 RX ORDER — ATORVASTATIN CALCIUM 20 MG/1
TABLET, FILM COATED ORAL
Qty: 14 TABLET | OUTPATIENT
Start: 2025-03-18

## 2025-03-18 RX ORDER — DONEPEZIL HYDROCHLORIDE 10 MG/1
10 TABLET, FILM COATED ORAL NIGHTLY
Qty: 14 TABLET | OUTPATIENT
Start: 2025-03-18

## 2025-05-21 DIAGNOSIS — E78.2 MIXED HYPERLIPIDEMIA: ICD-10-CM

## 2025-05-21 DIAGNOSIS — E55.9 VITAMIN D DEFICIENCY: Primary | ICD-10-CM

## 2025-05-21 DIAGNOSIS — F03.90 DEMENTIA WITHOUT BEHAVIORAL DISTURBANCE (MULTI): ICD-10-CM

## 2025-05-21 DIAGNOSIS — I10 ESSENTIAL HYPERTENSION: ICD-10-CM

## 2025-05-21 DIAGNOSIS — F10.10 ETOH ABUSE: ICD-10-CM

## 2025-05-21 DIAGNOSIS — F32.0 CURRENT MILD EPISODE OF MAJOR DEPRESSIVE DISORDER WITHOUT PRIOR EPISODE: ICD-10-CM

## 2025-05-22 RX ORDER — DONEPEZIL HYDROCHLORIDE 10 MG/1
10 TABLET, FILM COATED ORAL NIGHTLY
Qty: 14 TABLET | Status: SHIPPED | OUTPATIENT
Start: 2025-05-22

## 2025-05-22 RX ORDER — ASPIRIN 81 MG/1
TABLET ORAL
Qty: 14 TABLET | Status: SHIPPED | OUTPATIENT
Start: 2025-05-22

## 2025-05-22 RX ORDER — POTASSIUM CHLORIDE 20 MEQ/1
20 TABLET, EXTENDED RELEASE ORAL DAILY
Qty: 14 TABLET | Status: SHIPPED | OUTPATIENT
Start: 2025-05-22

## 2025-05-22 RX ORDER — LISINOPRIL 40 MG/1
40 TABLET ORAL DAILY
Qty: 14 TABLET | Status: SHIPPED | OUTPATIENT
Start: 2025-05-22

## 2025-05-22 RX ORDER — MIRTAZAPINE 15 MG/1
TABLET, FILM COATED ORAL
Qty: 14 TABLET | Status: SHIPPED | OUTPATIENT
Start: 2025-05-22

## 2025-05-22 RX ORDER — ATORVASTATIN CALCIUM 20 MG/1
TABLET, FILM COATED ORAL
Qty: 14 TABLET | Status: SHIPPED | OUTPATIENT
Start: 2025-05-22

## 2025-05-22 RX ORDER — VIT C/E/ZN/COPPR/LUTEIN/ZEAXAN 250MG-90MG
CAPSULE ORAL
Qty: 3 CAPSULE | Status: SHIPPED | OUTPATIENT
Start: 2025-05-22

## 2025-05-22 RX ORDER — LANOLIN ALCOHOL/MO/W.PET/CERES
CREAM (GRAM) TOPICAL
Qty: 14 TABLET | Status: SHIPPED | OUTPATIENT
Start: 2025-05-22

## 2025-06-05 ENCOUNTER — NURSING HOME VISIT (OUTPATIENT)
Dept: POST ACUTE CARE | Facility: EXTERNAL LOCATION | Age: 80
End: 2025-06-05
Payer: COMMERCIAL

## 2025-06-05 DIAGNOSIS — I10 ESSENTIAL HYPERTENSION: Primary | ICD-10-CM

## 2025-06-05 DIAGNOSIS — E53.8 VITAMIN B 12 DEFICIENCY: ICD-10-CM

## 2025-06-05 DIAGNOSIS — Z78.9 LIVING IN ASSISTED LIVING: ICD-10-CM

## 2025-06-05 DIAGNOSIS — F03.90 DEMENTIA WITHOUT BEHAVIORAL DISTURBANCE (MULTI): ICD-10-CM

## 2025-06-05 DIAGNOSIS — E55.9 VITAMIN D DEFICIENCY: ICD-10-CM

## 2025-06-05 DIAGNOSIS — E78.2 MIXED HYPERLIPIDEMIA: ICD-10-CM

## 2025-06-05 PROCEDURE — 99349 HOME/RES VST EST MOD MDM 40: CPT

## 2025-06-05 ASSESSMENT — PAIN SCALES - GENERAL: PAINLEVEL_OUTOF10: 0-NO PAIN

## 2025-06-05 NOTE — LETTER
"Patient: Allyssa Gregg  : 1945    Encounter Date: 2025    Subjective  Patient ID: Allyssa Gregg is a 79 y.o. female who is assisted living/ home patient being seen at [facility], and evaluated for Routine AL Visit.     HPI     Pt visited in apartment of assisted living. Pt laying on resting, awakens to voice. Oriented to self, unable to state , time or place, and denies pain. Pt a poor historian. Following mental health provider for dementia. Denies changes in vision, and appears to hear normal tone of voice. Poor dentition, denies difficulty with chewing and swallowing, and states \"I eat\".  Pt unable to recall what was eaten today for breakfast. Pt denies headaches, dizziness, congestion and cough.     Denies chest tenderness, and sob at rest/exertion. Pt hypotensive during today's visit, asymptomatic. Pt denies voiding symptoms and constipation. Pt utilizes wheelchair for mobility, and no recent falls reported. Requires assistance and cueing with ADL's. Pt with no concerns at this time. HPI limited due to cognition secondary to dementia. Collateral information obtained from nursing.     Medications Ordered Prior to Encounter[1]    Review of Systems   Constitutional:  Negative for activity change, appetite change, chills and fatigue.   HENT:  Positive for dental problem. Negative for congestion and rhinorrhea.    Eyes:  Negative for visual disturbance.   Respiratory:  Negative for cough and shortness of breath.    Cardiovascular:  Negative for leg swelling.   Gastrointestinal:  Negative for constipation, diarrhea, nausea and vomiting.   Genitourinary:  Negative for frequency.   Musculoskeletal:  Positive for gait problem.        Utilizes wheelchair for mobility.    Neurological:  Negative for dizziness, weakness and headaches.   Psychiatric/Behavioral:  Positive for confusion. Negative for sleep disturbance. The patient is not nervous/anxious.        Objective  BP 98/60   Pulse 75   Resp 16   Ht " 1.524 m (5')   Wt 59.2 kg (130 lb 9.6 oz)   SpO2 95%   BMI 25.51 kg/m²     Physical Exam  Constitutional:       General: She is awake. She is not in acute distress.     Appearance: Normal appearance. She is not ill-appearing.   HENT:      Head: Normocephalic.      Nose: No congestion or rhinorrhea.      Mouth/Throat:      Mouth: Mucous membranes are moist.   Eyes:      Conjunctiva/sclera: Conjunctivae normal.      Pupils: Pupils are equal, round, and reactive to light.   Cardiovascular:      Rate and Rhythm: Normal rate and regular rhythm.      Pulses: Normal pulses.      Heart sounds: Normal heart sounds.   Pulmonary:      Effort: Pulmonary effort is normal.      Breath sounds: Normal breath sounds.   Abdominal:      General: Bowel sounds are normal.      Palpations: Abdomen is soft.   Musculoskeletal:         General: Normal range of motion.      Cervical back: Normal range of motion.      Right lower leg: No edema.      Left lower leg: No edema.   Skin:     General: Skin is warm.      Capillary Refill: Capillary refill takes less than 2 seconds.   Neurological:      Mental Status: She is alert. Mental status is at baseline.      Comments: Oriented x1   Psychiatric:         Mood and Affect: Mood normal.         Speech: Speech normal.         Behavior: Behavior is cooperative.         Cognition and Memory: Cognition is impaired. Memory is impaired.      Comments: Non-sensical at times.          Assessment/Plan  Diagnoses and all orders for this visit:  Essential hypertension  Comments:  Blood pressure controlled. Hypotensive today. Parameters placed on lisinopril and amlodipine to hold for SBP less than 110.  Dementia without behavioral disturbance (Multi)  Comments:  Continue to follow psych for medication mgmt. Monitor for cognitive decline.  Vitamin D deficiency  Comments:  Continue Vitamin D.  Mixed hyperlipidemia  Comments:  Continue atorvastatin. Lipid panel reviewed from 2/20/2025.  Vitamin B 12  deficiency  Comments:  Vitamin B reviewed from 2/2025. Continue vitamin B.  Living in assisted living  Comments:  Medical and facility chart reviewed, medications reconciled and collaboration with nursing. CMP, CVC, Lipid Panel, A1C, and Vitamin B & D reviewed from 2/2025.              Electronically Signed By: MAIRA Santos-CNP   6/9/25  1:33 PM       [1]  Current Outpatient Medications on File Prior to Visit   Medication Sig Dispense Refill   • memantine (Namenda) 5 mg tablet Take 1 tablet (5 mg) by mouth 2 times a day.     • acetaminophen (Tylenol) 325 mg tablet Take 2 tablets (650 mg) by mouth every 6 hours if needed for fever (temp greater than 38.0 C) or mild pain (1 - 3). (Patient not taking: Reported on 6/9/2025)     • amLODIPine (Norvasc) 5 mg tablet Take 1 tablet (5 mg) by mouth once daily.     • aspirin 81 mg EC tablet TAKE 1 TABLET BY MOUTH ONCE DAILY FOR PAIN 14 tablet PRN   • atorvastatin (Lipitor) 20 mg tablet TAKE 1 TABLET BY MOUTH AT BEDTIME FOR LOWRER CHOLESTEROL 14 tablet PRN   • cholecalciferol (Vitamin D-3) 25 mcg (1,000 units) capsule TAKE 1 CAPSULE BY MOUTH WEEKLY ON THURSDAY FOR SUPPLEMENT 3 capsule PRN   • cyanocobalamin (Vitamin B-12) 100 mcg tablet Take 1 tablet (100 mcg) by mouth once daily.     • donepezil (Aricept) 10 mg tablet TAKE 1 TABLET BY MOUTH AT BEDTIME 14 tablet PRN   • lisinopril 40 mg tablet TAKE 1 TABLET BY MOUTH ONCE DAILY 14 tablet PRN   • melatonin 5 mg tablet Take 1 tablet (5 mg) by mouth as needed at bedtime for sleep.     • mirtazapine (Remeron) 15 mg tablet TAKE 1 TABLET BY MOUTH AT BEDTIME FOR INCREASED APPETITE 14 tablet PRN   • potassium chloride CR 20 mEq ER tablet TAKE 1 TABLET BY MOUTH ONCE DAILY 14 tablet PRN   • thiamine 100 mg tablet TAKE 1 TABLET BY MOUTH ONCE DAILY FOR SUPPLEMENT 14 tablet PRN     No current facility-administered medications on file prior to visit.

## 2025-06-09 VITALS
WEIGHT: 130.6 LBS | BODY MASS INDEX: 25.64 KG/M2 | HEART RATE: 75 BPM | DIASTOLIC BLOOD PRESSURE: 60 MMHG | HEIGHT: 60 IN | RESPIRATION RATE: 16 BRPM | OXYGEN SATURATION: 95 % | SYSTOLIC BLOOD PRESSURE: 98 MMHG

## 2025-06-09 RX ORDER — MEMANTINE HYDROCHLORIDE 5 MG/1
5 TABLET ORAL 2 TIMES DAILY
COMMUNITY
Start: 2025-05-12

## 2025-06-09 ASSESSMENT — ENCOUNTER SYMPTOMS
CONFUSION: 1
DIARRHEA: 0
SLEEP DISTURBANCE: 0
NERVOUS/ANXIOUS: 0
COUGH: 0
RHINORRHEA: 0
CHILLS: 0
FATIGUE: 0
HEADACHES: 0
CONSTIPATION: 0
DIZZINESS: 0
SHORTNESS OF BREATH: 0
APPETITE CHANGE: 0
ACTIVITY CHANGE: 0
FREQUENCY: 0
WEAKNESS: 0
VOMITING: 0
NAUSEA: 0

## 2025-06-09 NOTE — PROGRESS NOTES
"Subjective   Patient ID: Allyssa Gregg is a 79 y.o. female who is assisted living/ home patient being seen at [facility], and evaluated for Routine AL Visit.     HPI     Pt visited in apartment of assisted living. Pt laying on resting, awakens to voice. Oriented to self, unable to state , time or place, and denies pain. Pt a poor historian. Following mental health provider for dementia. Denies changes in vision, and appears to hear normal tone of voice. Poor dentition, denies difficulty with chewing and swallowing, and states \"I eat\".  Pt unable to recall what was eaten today for breakfast. Pt denies headaches, dizziness, congestion and cough.     Denies chest tenderness, and sob at rest/exertion. Pt hypotensive during today's visit, asymptomatic. Pt denies voiding symptoms and constipation. Pt utilizes wheelchair for mobility, and no recent falls reported. Requires assistance and cueing with ADL's. Pt with no concerns at this time. HPI limited due to cognition secondary to dementia. Collateral information obtained from nursing.     Medications Ordered Prior to Encounter[1]    Review of Systems   Constitutional:  Negative for activity change, appetite change, chills and fatigue.   HENT:  Positive for dental problem. Negative for congestion and rhinorrhea.    Eyes:  Negative for visual disturbance.   Respiratory:  Negative for cough and shortness of breath.    Cardiovascular:  Negative for leg swelling.   Gastrointestinal:  Negative for constipation, diarrhea, nausea and vomiting.   Genitourinary:  Negative for frequency.   Musculoskeletal:  Positive for gait problem.        Utilizes wheelchair for mobility.    Neurological:  Negative for dizziness, weakness and headaches.   Psychiatric/Behavioral:  Positive for confusion. Negative for sleep disturbance. The patient is not nervous/anxious.        Objective   BP 98/60   Pulse 75   Resp 16   Ht 1.524 m (5')   Wt 59.2 kg (130 lb 9.6 oz)   SpO2 95%   BMI 25.51 " kg/m²     Physical Exam  Constitutional:       General: She is awake. She is not in acute distress.     Appearance: Normal appearance. She is not ill-appearing.   HENT:      Head: Normocephalic.      Nose: No congestion or rhinorrhea.      Mouth/Throat:      Mouth: Mucous membranes are moist.   Eyes:      Conjunctiva/sclera: Conjunctivae normal.      Pupils: Pupils are equal, round, and reactive to light.   Cardiovascular:      Rate and Rhythm: Normal rate and regular rhythm.      Pulses: Normal pulses.      Heart sounds: Normal heart sounds.   Pulmonary:      Effort: Pulmonary effort is normal.      Breath sounds: Normal breath sounds.   Abdominal:      General: Bowel sounds are normal.      Palpations: Abdomen is soft.   Musculoskeletal:         General: Normal range of motion.      Cervical back: Normal range of motion.      Right lower leg: No edema.      Left lower leg: No edema.   Skin:     General: Skin is warm.      Capillary Refill: Capillary refill takes less than 2 seconds.   Neurological:      Mental Status: She is alert. Mental status is at baseline.      Comments: Oriented x1   Psychiatric:         Mood and Affect: Mood normal.         Speech: Speech normal.         Behavior: Behavior is cooperative.         Cognition and Memory: Cognition is impaired. Memory is impaired.      Comments: Non-sensical at times.          Assessment/Plan   Diagnoses and all orders for this visit:  Essential hypertension  Comments:  Blood pressure controlled. Hypotensive today. Parameters placed on lisinopril and amlodipine to hold for SBP less than 110.  Dementia without behavioral disturbance (Multi)  Comments:  Continue to follow psych for medication mgmt. Monitor for cognitive decline.  Vitamin D deficiency  Comments:  Continue Vitamin D.  Mixed hyperlipidemia  Comments:  Continue atorvastatin. Lipid panel reviewed from 2/20/2025.  Vitamin B 12 deficiency  Comments:  Vitamin B reviewed from 2/2025. Continue vitamin  B.  Living in assisted living  Comments:  Medical and facility chart reviewed, medications reconciled and collaboration with nursing. CMP, CVC, Lipid Panel, A1C, and Vitamin B & D reviewed from 2/2025.               [1]   Current Outpatient Medications on File Prior to Visit   Medication Sig Dispense Refill    memantine (Namenda) 5 mg tablet Take 1 tablet (5 mg) by mouth 2 times a day.      acetaminophen (Tylenol) 325 mg tablet Take 2 tablets (650 mg) by mouth every 6 hours if needed for fever (temp greater than 38.0 C) or mild pain (1 - 3). (Patient not taking: Reported on 6/9/2025)      amLODIPine (Norvasc) 5 mg tablet Take 1 tablet (5 mg) by mouth once daily.      aspirin 81 mg EC tablet TAKE 1 TABLET BY MOUTH ONCE DAILY FOR PAIN 14 tablet PRN    atorvastatin (Lipitor) 20 mg tablet TAKE 1 TABLET BY MOUTH AT BEDTIME FOR LOWRER CHOLESTEROL 14 tablet PRN    cholecalciferol (Vitamin D-3) 25 mcg (1,000 units) capsule TAKE 1 CAPSULE BY MOUTH WEEKLY ON THURSDAY FOR SUPPLEMENT 3 capsule PRN    cyanocobalamin (Vitamin B-12) 100 mcg tablet Take 1 tablet (100 mcg) by mouth once daily.      donepezil (Aricept) 10 mg tablet TAKE 1 TABLET BY MOUTH AT BEDTIME 14 tablet PRN    lisinopril 40 mg tablet TAKE 1 TABLET BY MOUTH ONCE DAILY 14 tablet PRN    melatonin 5 mg tablet Take 1 tablet (5 mg) by mouth as needed at bedtime for sleep.      mirtazapine (Remeron) 15 mg tablet TAKE 1 TABLET BY MOUTH AT BEDTIME FOR INCREASED APPETITE 14 tablet PRN    potassium chloride CR 20 mEq ER tablet TAKE 1 TABLET BY MOUTH ONCE DAILY 14 tablet PRN    thiamine 100 mg tablet TAKE 1 TABLET BY MOUTH ONCE DAILY FOR SUPPLEMENT 14 tablet PRN     No current facility-administered medications on file prior to visit.

## 2025-09-03 ENCOUNTER — NURSING HOME VISIT (OUTPATIENT)
Dept: POST ACUTE CARE | Facility: EXTERNAL LOCATION | Age: 80
End: 2025-09-03
Payer: COMMERCIAL

## 2025-09-03 DIAGNOSIS — I10 ESSENTIAL HYPERTENSION: Primary | ICD-10-CM

## 2025-09-03 DIAGNOSIS — E55.9 VITAMIN D DEFICIENCY: ICD-10-CM

## 2025-09-03 DIAGNOSIS — Z78.9 LIVING IN ASSISTED LIVING: ICD-10-CM

## 2025-09-03 DIAGNOSIS — E78.2 MIXED HYPERLIPIDEMIA: ICD-10-CM

## 2025-09-03 DIAGNOSIS — F03.90 DEMENTIA WITHOUT BEHAVIORAL DISTURBANCE (MULTI): ICD-10-CM

## 2025-09-03 ASSESSMENT — PAIN SCALES - GENERAL: PAINLEVEL_OUTOF10: 0-NO PAIN

## 2025-09-04 VITALS
WEIGHT: 130.6 LBS | DIASTOLIC BLOOD PRESSURE: 54 MMHG | BODY MASS INDEX: 25.64 KG/M2 | SYSTOLIC BLOOD PRESSURE: 102 MMHG | RESPIRATION RATE: 16 BRPM | HEIGHT: 60 IN | HEART RATE: 70 BPM

## 2025-09-04 ASSESSMENT — ENCOUNTER SYMPTOMS
DIZZINESS: 0
COUGH: 0
NERVOUS/ANXIOUS: 0
HEADACHES: 0
SLEEP DISTURBANCE: 0
CONFUSION: 1
ACTIVITY CHANGE: 0
WEAKNESS: 1
SHORTNESS OF BREATH: 0
FATIGUE: 0
CHILLS: 0
FREQUENCY: 0
RHINORRHEA: 0
NAUSEA: 0
VOMITING: 0
DIARRHEA: 0
CONSTIPATION: 0
APPETITE CHANGE: 0